# Patient Record
Sex: MALE | Race: WHITE
[De-identification: names, ages, dates, MRNs, and addresses within clinical notes are randomized per-mention and may not be internally consistent; named-entity substitution may affect disease eponyms.]

---

## 2018-01-08 ENCOUNTER — HOSPITAL ENCOUNTER (OUTPATIENT)
Dept: HOSPITAL 89 - LAB | Age: 73
End: 2018-01-08
Attending: INTERNAL MEDICINE
Payer: MEDICARE

## 2018-01-08 VITALS — BODY MASS INDEX: 24.78 KG/M2

## 2018-01-08 DIAGNOSIS — I82.409: Primary | ICD-10-CM

## 2018-01-08 LAB — INR PPP: 6.4

## 2018-01-08 PROCEDURE — 36415 COLL VENOUS BLD VENIPUNCTURE: CPT

## 2018-01-08 PROCEDURE — 85610 PROTHROMBIN TIME: CPT

## 2018-01-10 ENCOUNTER — HOSPITAL ENCOUNTER (OUTPATIENT)
Dept: HOSPITAL 89 - LAB | Age: 73
End: 2018-01-10
Attending: INTERNAL MEDICINE
Payer: MEDICARE

## 2018-01-10 VITALS — BODY MASS INDEX: 24.78 KG/M2

## 2018-01-10 DIAGNOSIS — I82.409: Primary | ICD-10-CM

## 2018-01-10 LAB — INR PPP: 3.84

## 2018-01-10 PROCEDURE — 85610 PROTHROMBIN TIME: CPT

## 2018-01-10 PROCEDURE — 36415 COLL VENOUS BLD VENIPUNCTURE: CPT

## 2018-01-16 ENCOUNTER — HOSPITAL ENCOUNTER (OUTPATIENT)
Dept: HOSPITAL 89 - LAB | Age: 73
End: 2018-01-16
Attending: INTERNAL MEDICINE
Payer: MEDICARE

## 2018-01-16 VITALS — BODY MASS INDEX: 24.78 KG/M2

## 2018-01-16 DIAGNOSIS — R79.89: ICD-10-CM

## 2018-01-16 DIAGNOSIS — E78.5: ICD-10-CM

## 2018-01-16 DIAGNOSIS — R94.31: ICD-10-CM

## 2018-01-16 DIAGNOSIS — I82.409: ICD-10-CM

## 2018-01-16 DIAGNOSIS — I10: ICD-10-CM

## 2018-01-16 DIAGNOSIS — R06.09: ICD-10-CM

## 2018-01-16 DIAGNOSIS — E03.9: ICD-10-CM

## 2018-01-16 DIAGNOSIS — R07.9: ICD-10-CM

## 2018-01-16 DIAGNOSIS — M10.9: ICD-10-CM

## 2018-01-16 DIAGNOSIS — I28.8: ICD-10-CM

## 2018-01-16 DIAGNOSIS — J98.6: ICD-10-CM

## 2018-01-16 DIAGNOSIS — Z96.611: Primary | ICD-10-CM

## 2018-01-16 LAB
INR PPP: 2.81
LDLC SERPL-MCNC: 132 MG/DL
PLATELET COUNT, AUTOMATED: 211 K/UL (ref 150–450)

## 2018-01-16 PROCEDURE — 84450 TRANSFERASE (AST) (SGOT): CPT

## 2018-01-16 PROCEDURE — 83718 ASSAY OF LIPOPROTEIN: CPT

## 2018-01-16 PROCEDURE — 84295 ASSAY OF SERUM SODIUM: CPT

## 2018-01-16 PROCEDURE — 84520 ASSAY OF UREA NITROGEN: CPT

## 2018-01-16 PROCEDURE — 82247 BILIRUBIN TOTAL: CPT

## 2018-01-16 PROCEDURE — 82374 ASSAY BLOOD CARBON DIOXIDE: CPT

## 2018-01-16 PROCEDURE — 82947 ASSAY GLUCOSE BLOOD QUANT: CPT

## 2018-01-16 PROCEDURE — 71046 X-RAY EXAM CHEST 2 VIEWS: CPT

## 2018-01-16 PROCEDURE — 82435 ASSAY OF BLOOD CHLORIDE: CPT

## 2018-01-16 PROCEDURE — 82310 ASSAY OF CALCIUM: CPT

## 2018-01-16 PROCEDURE — 71275 CT ANGIOGRAPHY CHEST: CPT

## 2018-01-16 PROCEDURE — 82565 ASSAY OF CREATININE: CPT

## 2018-01-16 PROCEDURE — 84478 ASSAY OF TRIGLYCERIDES: CPT

## 2018-01-16 PROCEDURE — 83880 ASSAY OF NATRIURETIC PEPTIDE: CPT

## 2018-01-16 PROCEDURE — 84075 ASSAY ALKALINE PHOSPHATASE: CPT

## 2018-01-16 PROCEDURE — 82465 ASSAY BLD/SERUM CHOLESTEROL: CPT

## 2018-01-16 PROCEDURE — 85610 PROTHROMBIN TIME: CPT

## 2018-01-16 PROCEDURE — 85379 FIBRIN DEGRADATION QUANT: CPT

## 2018-01-16 PROCEDURE — 84132 ASSAY OF SERUM POTASSIUM: CPT

## 2018-01-16 PROCEDURE — 84443 ASSAY THYROID STIM HORMONE: CPT

## 2018-01-16 PROCEDURE — 82040 ASSAY OF SERUM ALBUMIN: CPT

## 2018-01-16 PROCEDURE — 85025 COMPLETE CBC W/AUTO DIFF WBC: CPT

## 2018-01-16 PROCEDURE — 84460 ALANINE AMINO (ALT) (SGPT): CPT

## 2018-01-16 PROCEDURE — 36415 COLL VENOUS BLD VENIPUNCTURE: CPT

## 2018-01-16 PROCEDURE — 84155 ASSAY OF PROTEIN SERUM: CPT

## 2018-01-16 PROCEDURE — 84550 ASSAY OF BLOOD/URIC ACID: CPT

## 2018-01-16 NOTE — RADIOLOGY IMAGING REPORT
FACILITY: [facility] 

 

PATIENT NAME: Adán Ford

: 1945

MR: [id number]

V: [id number]

EXAM DATE: 

ORDERING PHYSICIAN: OMARI DREW

TECHNOLOGIST: 

 

Location: [facility]

Patient: Adán Ford

: 1945

MRN: [id number]

Visit/Account:[id number]

Date of Sevice:  2018

 

ACCESSION #: [id number]

 

Exam type: CHEST PA AND LAT

 

History: sob

 

Comparison: 2016.

 

Findings:

 

There is chronic elevation of the right hemidiaphragm.  No evidence of acute-appearing pulmonary cons
olidation pleural effusion or pulmonary edema.  The cardiac silhouette is normal in size.  There Is a
 left shoulder arthroplasty.

 

IMPRESSION:

 

1.  Chronic elevation of the right hemidiaphragm although no evidence of acute pulmonary consolidatio
n seen

 

Report Dictated By: Salina Pereira MD at 2018 2:07 PM

 

Report E-Signed By: Salina Pereira MD  at 2018 2:09 PM

 

WSN:[id number]

## 2018-01-16 NOTE — RADIOLOGY IMAGING REPORT
FACILITY: [facility] 

 

PATIENT NAME: Adán Ford

: 1945

MR: [id number]

V: [id number]

EXAM DATE: 

ORDERING PHYSICIAN: OMARI DREW

TECHNOLOGIST: 

 

Location: [facility]

Patient: Adán Ford

: 1945

MRN: [id number]

Visit/Account:[id number]

Date of Sevice:  2018

 

ACCESSION #: [id number]

 

CTA CHEST WW/O CNTR (PULM ANG)

 

HISTORY:  see dx

 

ADDITIONAL HISTORY:  Elevated d-dimer. Shortness of breath.

 

TECHNIQUE:  CTA chest with intravenous contrast.  Axial imaging acquired following administration of 
IV contrast timed for maximum opacification of the pulmonary arterial vasculature.  Slab 3-D MIP diana
nstructed images were also created for further evaluation and interpretation. Reconstruction of the [facility] data set includes multiplanar 2-D in the sagittal and coronal planes and 3-D reconstructed kami
nal slab MIP series.  3-D images were created by the technologist.  One of the following dose optimiz
ation techniques was utilized in the performance of this exam: Automated exposure control; adjustment
 of the mA and/or kV according to the patient's size; or use of an iterative  reconstruction techniqu
e.  Specific details can be referenced in the facility's radiology CT exam operational policy.

 

 

CONTRAST:  75  mL Isovue-370

 

COMPARISON:  Comparison abdomen pelvis CT 2017

 

FINDINGS:

 

Lungs/pleura:  There is persistent mild peribronchial thickening seen right lower lobe unchanged. The
re is associated mild right posterior basilar subpleural atelectasis or scar. There is a questionable
 1.2 x 0.8 cm nodule with in the right pleura which is seen axial image 143-150 series 4.

 

Heart/vessels:  There is filling defect in the right lower lobe pulmonary artery with nonenhancement 
of the right lower lobe pulmonary arterial tree. There is a thin thread of persistent central luminal
 patency with contrasted blood passing through this area. This is best appreciated coronal axial imag
es 135-142 series 4. Pulmonary arterial tree is otherwise normal in appearance.

 

No convincing evidence of right heart strain. Pulmonary trunk is prominent however measuring 2.7 cm d
iameter.

 

Prominent vascular plaque is seen in the LAD.

 

Mediastinum/lymph nodes:  Negative.

 

Visualized upper abdomen:  Negative.

 

Bones/soft tissues:  Negative.

 

Additional findings:  None

 

IMPRESSION:

 

Near-complete obstruction of the right lower lobe pulmonary artery likely related to an acute or suba
cute right lower lobe pulmonary embolus. There is associated mild chronic right lower lobe peribronch
ial thickening and minimal right basilar scarring with a questionable right lower lobe pleural nodule
. This raises the less likely possibility of malignancy but should warrant a follow-up study. Consequ
ently, I would recommend a short-term follow-up contrasted CTA chest in 4 weeks to ensure sure there 
is resolution of the right lower lobe pulmonary arterial pathology and to reevaluate the right lower 
lobe pleura.

 

Suspect pulmonary hypertension

 

Results were called to OMARI DREW  at  2018 4:30 PM. He informs me the patient does have a h
istory of previous pulmonary embolus but I do not have access to those prior exams. Acuity of the cur
rent pulmonary embolus is uncertain although with residual tiny central patent lumen suggests it is o
rganized and either subacute or chronic

 

Report Dictated By: Mat Villarreal MD at 2018 2:44 PM

 

Report E-Signed By: Mat Villarreal MD  at 2018 4:22 PM

 

WSN:[id number]

## 2018-01-16 NOTE — EKG
FACILITY: [facility] 

 

PATIENT NAME: ROSCOE HARLEY

: [id number]

MR: [id number]

V: [id number]

EXAM DATE: 

ORDERING PHYSICIAN: OMARI DREW

TECHNOLOGIST: MRAGON

 

Test Reason : SOB

Blood Pressure : ***/*** mmHG

Vent. Rate : 082 BPM     Atrial Rate : 082 BPM

   P-R Int : 218 ms          QRS Dur : 104 ms

    QT Int : 392 ms       P-R-T Axes : 063 -81 061 degrees

   QTc Int : 457 ms

 

Sinus rhythm with 1st degree AV block

Left axis deviation

Abnormal ECG

When compared with ECG of 28-DEC-2016 09:52,

premature ventricular complexes are no longer present

 

Referred By:             Confirmed By:

## 2018-01-17 ENCOUNTER — HOSPITAL ENCOUNTER (OUTPATIENT)
Dept: HOSPITAL 89 - LAB | Age: 73
End: 2018-01-17
Attending: INTERNAL MEDICINE
Payer: MEDICARE

## 2018-01-17 VITALS — BODY MASS INDEX: 24.78 KG/M2

## 2018-01-17 DIAGNOSIS — I82.409: Primary | ICD-10-CM

## 2018-01-17 LAB — INR PPP: 2.68

## 2018-01-17 PROCEDURE — 36415 COLL VENOUS BLD VENIPUNCTURE: CPT

## 2018-01-17 PROCEDURE — 85610 PROTHROMBIN TIME: CPT

## 2018-01-22 ENCOUNTER — HOSPITAL ENCOUNTER (OUTPATIENT)
Dept: HOSPITAL 89 - RESP | Age: 73
End: 2018-01-22
Attending: INTERNAL MEDICINE
Payer: MEDICARE

## 2018-01-22 VITALS — BODY MASS INDEX: 24.78 KG/M2

## 2018-01-22 DIAGNOSIS — I37.1: ICD-10-CM

## 2018-01-22 DIAGNOSIS — I51.7: Primary | ICD-10-CM

## 2018-01-22 DIAGNOSIS — I07.1: ICD-10-CM

## 2018-01-22 DIAGNOSIS — I34.0: ICD-10-CM

## 2018-01-22 DIAGNOSIS — I70.8: ICD-10-CM

## 2018-01-22 DIAGNOSIS — I27.20: ICD-10-CM

## 2018-01-22 DIAGNOSIS — I35.2: ICD-10-CM

## 2018-01-22 PROCEDURE — 93306 TTE W/DOPPLER COMPLETE: CPT

## 2018-01-22 PROCEDURE — 94726 PLETHYSMOGRAPHY LUNG VOLUMES: CPT

## 2018-01-22 PROCEDURE — 94729 DIFFUSING CAPACITY: CPT

## 2018-01-22 PROCEDURE — 94060 EVALUATION OF WHEEZING: CPT

## 2018-01-23 NOTE — RADIOLOGY IMAGING REPORT
FACILITY: [facility] 

 

PATIENT NAME: ROSCOE HARLEY

: [id number]

MR: [id number]

V: [id number]

EXAM DATE: 

ORDERING PHYSICIAN: OMARI DREW

TECHNOLOGIST: Shilpa Crouch

 

EXAMINATION:TWO-DIMENSIONAL ECHOCARDIOGRAPH

REASON:SHORTNESS OF BREATH.

 

2D Measurements (normal values in centimeters)

LV endLV endRV endVent.LV PostAorticLeftPercent

DiastolicSystolicDiastolicSeptumWallRootAtriumShortening

(3.5-5.7)(0.9-2.6)(0.6-1.1)(0.6-1.1)(2.0-3.7)(1.9-4.0)(25-35%)

 

[id number]%

 

STROKE VOLUME:  73 mL

ESTIMATED EJECTION FRACTION:75%

 

PARASTERNAL LONG AXIS:

Overall left ventricular systolic function does appear to be normal.  

No specific wall motion abnormalities are noted.  The right ventricle 

also appears to contract normally.  Aortic valve is sclerotic. Color 

examination of the valves revealed a trace of mitral and aortic 

insufficiency present.  TAPSE is measured at 1.8 which is within normal 

range for right ventricular function.  

 

PARASTERNAL SHORT AXIS: 

Somewhat technically difficult but again overall left ventricular 

function appears to be normal.  Aortic valve is not well seen but it 

does appear to be sclerotic possibly borderline stenotic.  Trace of 

aortic insufficiency is noted.  

 

APICAL FOUR AND TWO CHAMBER: 

Normal left ventricular and right ventricular systolic function.  

Aortic valve area was measured at 2.0 cm2 with mean pressure gradient 

across the valve of 14 mmHg and a dimensionless index of 0.4 indicating 

mild to moderate stenosis.  There is some aortic insufficiency present. 

 Mitral valve area was measured within normal range at 4.7 cm2.  Left 

atrial volume is moderately increased at 35 mL/m2.  Right atrial volume 

is measured within normal range at 13 mL/m2.  Mild amount of tricuspid 

insufficiency is noted. Tricuspid regurgitation V-max is measured at 

3.22 m/sec.  Estimated right atrial pressure is 3 mmHg.  There is mild 

mitral annular calcification but no stenosis of the valve.  No wall 

motion abnormalities are noted. 

 

SUBCOSTAL VIEW: 

Somewhat technically difficult but no pericardial effusion was noted.  

No atrial septal or ventricular septal defects were appreciated.  

Doppler examination of the mitral valve in diastole does reveal the A 

wave greater than the E wave.  IVC is normal in size.

 

OVERALL IMPRESSION: 

1. Normal left ventricular ejection fraction between 70 to 75%.  No 

wall motion abnormalities were noted.  

2. Grade 1/4 decrease in diastolic function.  

3. Mild right ventricular enlargement and moderate left atrial 

enlargement. The other chamber sizes are normal.  

4. The aortic valve which appears to be mild to borderline moderately 

stenotic.  The valve area was measured at 2.0 cm2 but the mean pressure 

gradient across the valve was 14 mmHg and the dimensionless index was 

0.4.  There is a trace amount of aortic insufficiency present.

5. There is mild mitral annular calcification.  No stenosis in the 

mitral valve was noted.  Trace to mild amount of mitral insufficiency 

was noted.

6. Trace amount of pulmonic insufficiency is noted.

7. A trace to mild amount of tricuspid insufficiency with estimated 

right ventricular systolic pressures of 44 mmHg which does include an 

estimated right atrial pressure of 3 mmHg indicating mild to borderline 

moderate pulmonary hypertension and increased right ventricular 

systolic pressures. 

8. In comparison to the examination done on 05/10/13, the only change 

has been that the aortic valve has now become stenotic.  The right 

ventricular pressures are approximately the same. 

 

 

 

Dictated by: LINSEY Coello M.D. on 2018 at 21:49   

Transcribed by: MIRNA on 2018 at 10:23    

Approved by: LINSEY Coello M.D. on 2018 at 19:53   

[facility]

## 2018-01-24 ENCOUNTER — HOSPITAL ENCOUNTER (OUTPATIENT)
Dept: HOSPITAL 89 - LAB | Age: 73
End: 2018-01-24
Attending: INTERNAL MEDICINE
Payer: MEDICARE

## 2018-01-24 VITALS — BODY MASS INDEX: 24.78 KG/M2

## 2018-01-24 DIAGNOSIS — I82.409: Primary | ICD-10-CM

## 2018-01-24 LAB — INR PPP: 2.91

## 2018-01-24 PROCEDURE — 36415 COLL VENOUS BLD VENIPUNCTURE: CPT

## 2018-01-24 PROCEDURE — 85610 PROTHROMBIN TIME: CPT

## 2018-02-02 ENCOUNTER — HOSPITAL ENCOUNTER (OUTPATIENT)
Dept: HOSPITAL 89 - LAB | Age: 73
End: 2018-02-02
Attending: INTERNAL MEDICINE
Payer: MEDICARE

## 2018-02-02 VITALS — BODY MASS INDEX: 24.78 KG/M2

## 2018-02-02 DIAGNOSIS — I82.409: Primary | ICD-10-CM

## 2018-02-02 LAB — INR PPP: 3.25

## 2018-02-02 PROCEDURE — 36415 COLL VENOUS BLD VENIPUNCTURE: CPT

## 2018-02-02 PROCEDURE — 85610 PROTHROMBIN TIME: CPT

## 2018-02-15 ENCOUNTER — HOSPITAL ENCOUNTER (OUTPATIENT)
Dept: HOSPITAL 89 - CT | Age: 73
End: 2018-02-15
Attending: INTERNAL MEDICINE
Payer: MEDICARE

## 2018-02-15 VITALS — BODY MASS INDEX: 24.78 KG/M2

## 2018-02-15 DIAGNOSIS — J98.11: Primary | ICD-10-CM

## 2018-02-15 LAB — INR PPP: 2.39

## 2018-02-15 PROCEDURE — 85610 PROTHROMBIN TIME: CPT

## 2018-02-15 PROCEDURE — 71275 CT ANGIOGRAPHY CHEST: CPT

## 2018-02-15 PROCEDURE — 36415 COLL VENOUS BLD VENIPUNCTURE: CPT

## 2018-02-15 NOTE — RADIOLOGY IMAGING REPORT
FACILITY: [facility] 

 

PATIENT NAME: Adán Ford

: 1945

MR: [id number]

V: [id number]

EXAM DATE: 

ORDERING PHYSICIAN: OMARI DREW

TECHNOLOGIST: 

 

Location: [facility]

Patient: Adán Ford

: 1945

MRN: [id number]

Visit/Account:[id number]

Date of Sevice:  2/15/2018

 

ACCESSION #: [id number]

 

CTA CHEST WW/O CNTR (PULM ANG)

 

HISTORY:  PE, CP

 

TECHNIQUE:  CTA chest with intravenous contrast attention to pulmonary arteries.  Sagittal, coronal a
nd slab 3D MIP coronal reconstructed images were also created for further evaluation and interpretati
on. One of the following dose optimization techniques was utilized in the performance of this exam: a
utomated exposure control; adjustment of the mA and/or kV according to the patient's size; or use of 
an iterative reconstruction technique.  Specific details can be referenced in the facility's radiolog
y CT exam operational policy.

 

CONTRAST:  75 mL Isovue-370 IV.

 

COMPARISON:  CT chest 2018.

 

FINDINGS:

 

Heart/vessels:  There is no filling defect in either the right or left pulmonary arterial vascular tr
ee.  Again seen is narrowing of the right lower lobe pulmonary artery, with adjacent soft tissue thic
kening.  This is unchanged dating back to 10/24/2016.

 

Lungs/pleura:  There is mild atelectasis right lower lobe.  There is mild pleural thickening right trinh
ng base, unchanged.  The lungs are otherwise clear.

 

Mediastinum:  Normal.

 

Lymph nodes:  There is no lymphadenopathy.

 

Visualized upper abdomen:  Visualized portions of the liver, spleen, adrenal glands, and pancreas are
 normal.

 

Bones/soft tissues:  There are postoperative changes from a left shoulder arthroplasty.

 

IMPRESSION:

1.  No evidence of acute pulmonary embolus.  There is poor filling of the right lower lobe pulmonary 
artery, with adjacent soft tissue thickening, with narrowing of the artery.  This is not significantl
y changed from comparison CT abdomen pelvis 10/24/2016.  Although CT abdomen pelvis was performed wit
h slightly different bolus technique.  This is unchanged from prior CT chest 2018.  There is par
tial atelectasis left lower lobe, likely secondary to postobstructive atelectasis.  Recommend follow-
up CT chest with IV contrast in 6 months to confirm stability.

2.  Postoperative changes from a left shoulder arthroplasty.

 

Report Dictated By: Siva Zimmerman at 2/15/2018 9:06 AM

 

Report E-Signed By: Siva Zimmerman  at 2/15/2018 9:23 AM

 

WSN:[id number]

## 2018-02-20 ENCOUNTER — HOSPITAL ENCOUNTER (OUTPATIENT)
Dept: HOSPITAL 89 - RESP | Age: 73
End: 2018-02-20
Attending: INTERNAL MEDICINE
Payer: MEDICARE

## 2018-02-20 VITALS — BODY MASS INDEX: 24.78 KG/M2

## 2018-02-20 DIAGNOSIS — R06.09: ICD-10-CM

## 2018-02-20 DIAGNOSIS — I26.99: ICD-10-CM

## 2018-02-20 DIAGNOSIS — R07.9: Primary | ICD-10-CM

## 2018-02-20 DIAGNOSIS — I82.409: ICD-10-CM

## 2018-02-20 DIAGNOSIS — I10: ICD-10-CM

## 2018-02-20 PROCEDURE — 78452 HT MUSCLE IMAGE SPECT MULT: CPT

## 2018-02-20 PROCEDURE — 93017 CV STRESS TEST TRACING ONLY: CPT

## 2018-02-20 NOTE — RADIOLOGY IMAGING REPORT
[place] [facility] 

 

PATIENT NAME: Adán Ford

: 1945

MR: [id number]

V: [id number]

EXAM DATE: 

ORDERING PHYSICIAN: OMARI DREW

TECHNOLOGIST: 

 

Location: [facility]

Patient: Adán Ford

: 1945

MRN: [id number]

Visit/Account:[id number]

Date of Sevice:  2018

 

ACCESSION #: [id number]

 

EXAMINATION:  Single isotope SPECT imaging with regadenoson infusion and gated SPECT imaging.

 

DATE OF EXAMINATION:  18.

 

DATE OF INTERPRETATION:  18.

 

REQUESTING PHYSICIAN:  OMARI DREW.

 

INDICATION:  The patient is a 72-year-old M evaluated for CP.

 

PROCEDURE:  After informed consent the patient received an intravenous injection of 12.2 mCi of Tc-99
m sestamibi followed at an appropriate time interval by rest imaging.  The patient then subsequently 
received an intravenous infusion of 0.4 mg of regadenoson per protocol without complication.  Resting
 heart rate was 82 bpm with a peak heart rate of 99 bpm.  Blood pressure at rest was 168 / 99 and fol
lowing infusion was 168 / 99.  Baseline EKG demonstrates sinus rhythm.  There were no EKG changes of 
ischemia following infusion.  Symptoms were nonspecific.  The patient then received an intravenous in
jection of 30.3 mCi of Tc-99m sestamibi followed by stress imaging.

 

RAW DATA:  Examination of the summed raw data revealed a good quality study.

 

MYOCARDIAL PERFUSION:  The tomographic images demonstrate normal myocardial perfusion with no evidenc
e of infarct or ischemia. There is no TID.

 

GATED IMAGES:  The gated images demonstrate hyperdynamic ejection fraction >70% with normal wall jean paul
on and thickening.

 

IMPRESSION:

 

1.  Nondiagnostic Lexiscan stress ECG

2.  Normal myocardial perfusion scan.

3.  Hyperdynamic LV systolic function; LVEF >70%.

4. Based on the results of this exam, the patient appears to be at low risk for future cardiovascular
 events.

 

Report Dictated By: Harsh Jasso at 2018 12:23 PM

 

Report E-Signed By: Harsh Jasso  at 2018 12:27 PM

 

WSN:[id number]

## 2018-03-01 ENCOUNTER — HOSPITAL ENCOUNTER (OUTPATIENT)
Dept: HOSPITAL 89 - LAB | Age: 73
End: 2018-03-01
Attending: INTERNAL MEDICINE
Payer: MEDICARE

## 2018-03-01 VITALS — BODY MASS INDEX: 24.78 KG/M2

## 2018-03-01 DIAGNOSIS — Z79.01: ICD-10-CM

## 2018-03-01 DIAGNOSIS — Z51.81: Primary | ICD-10-CM

## 2018-03-01 LAB — INR PPP: 1.99

## 2018-03-01 PROCEDURE — 85610 PROTHROMBIN TIME: CPT

## 2018-03-01 PROCEDURE — 36415 COLL VENOUS BLD VENIPUNCTURE: CPT

## 2018-03-15 ENCOUNTER — HOSPITAL ENCOUNTER (OUTPATIENT)
Dept: HOSPITAL 89 - LAB | Age: 73
End: 2018-03-15
Attending: INTERNAL MEDICINE
Payer: MEDICARE

## 2018-03-15 VITALS — BODY MASS INDEX: 24.78 KG/M2

## 2018-03-15 DIAGNOSIS — Z79.01: ICD-10-CM

## 2018-03-15 DIAGNOSIS — Z51.81: Primary | ICD-10-CM

## 2018-03-15 LAB — INR PPP: 1.49

## 2018-03-15 PROCEDURE — 36415 COLL VENOUS BLD VENIPUNCTURE: CPT

## 2018-03-15 PROCEDURE — 85610 PROTHROMBIN TIME: CPT

## 2018-03-23 ENCOUNTER — HOSPITAL ENCOUNTER (OUTPATIENT)
Dept: HOSPITAL 89 - LAB | Age: 73
End: 2018-03-23
Attending: INTERNAL MEDICINE
Payer: MEDICARE

## 2018-03-23 VITALS — BODY MASS INDEX: 24.78 KG/M2

## 2018-03-23 DIAGNOSIS — I26.99: Primary | ICD-10-CM

## 2018-03-23 LAB — INR PPP: 1.43

## 2018-03-23 PROCEDURE — 36415 COLL VENOUS BLD VENIPUNCTURE: CPT

## 2018-03-23 PROCEDURE — 85610 PROTHROMBIN TIME: CPT

## 2018-05-04 ENCOUNTER — HOSPITAL ENCOUNTER (OUTPATIENT)
Dept: HOSPITAL 89 - LAB | Age: 73
End: 2018-05-04
Attending: INTERNAL MEDICINE
Payer: MEDICARE

## 2018-05-04 VITALS — BODY MASS INDEX: 24.78 KG/M2

## 2018-05-04 DIAGNOSIS — Z79.01: ICD-10-CM

## 2018-05-04 DIAGNOSIS — Z51.81: Primary | ICD-10-CM

## 2018-05-04 LAB — INR PPP: 3.59

## 2018-05-04 PROCEDURE — 85610 PROTHROMBIN TIME: CPT

## 2018-05-04 PROCEDURE — 36415 COLL VENOUS BLD VENIPUNCTURE: CPT

## 2018-05-17 ENCOUNTER — HOSPITAL ENCOUNTER (OUTPATIENT)
Dept: HOSPITAL 89 - LAB | Age: 73
End: 2018-05-17
Attending: INTERNAL MEDICINE
Payer: MEDICARE

## 2018-05-17 VITALS — BODY MASS INDEX: 24.78 KG/M2

## 2018-05-17 DIAGNOSIS — I10: ICD-10-CM

## 2018-05-17 DIAGNOSIS — R06.09: ICD-10-CM

## 2018-05-17 DIAGNOSIS — E03.9: ICD-10-CM

## 2018-05-17 DIAGNOSIS — I26.99: ICD-10-CM

## 2018-05-17 DIAGNOSIS — M10.9: ICD-10-CM

## 2018-05-17 DIAGNOSIS — Z12.5: Primary | ICD-10-CM

## 2018-05-17 LAB — PLATELET COUNT, AUTOMATED: 232 K/UL (ref 150–450)

## 2018-05-17 PROCEDURE — 82565 ASSAY OF CREATININE: CPT

## 2018-05-17 PROCEDURE — 84153 ASSAY OF PSA TOTAL: CPT

## 2018-05-17 PROCEDURE — 82374 ASSAY BLOOD CARBON DIOXIDE: CPT

## 2018-05-17 PROCEDURE — 82435 ASSAY OF BLOOD CHLORIDE: CPT

## 2018-05-17 PROCEDURE — 84460 ALANINE AMINO (ALT) (SGPT): CPT

## 2018-05-17 PROCEDURE — 84075 ASSAY ALKALINE PHOSPHATASE: CPT

## 2018-05-17 PROCEDURE — 81001 URINALYSIS AUTO W/SCOPE: CPT

## 2018-05-17 PROCEDURE — 82947 ASSAY GLUCOSE BLOOD QUANT: CPT

## 2018-05-17 PROCEDURE — 85025 COMPLETE CBC W/AUTO DIFF WBC: CPT

## 2018-05-17 PROCEDURE — 84155 ASSAY OF PROTEIN SERUM: CPT

## 2018-05-17 PROCEDURE — 84132 ASSAY OF SERUM POTASSIUM: CPT

## 2018-05-17 PROCEDURE — 82310 ASSAY OF CALCIUM: CPT

## 2018-05-17 PROCEDURE — 84295 ASSAY OF SERUM SODIUM: CPT

## 2018-05-17 PROCEDURE — 84450 TRANSFERASE (AST) (SGOT): CPT

## 2018-05-17 PROCEDURE — 84520 ASSAY OF UREA NITROGEN: CPT

## 2018-05-17 PROCEDURE — 82040 ASSAY OF SERUM ALBUMIN: CPT

## 2018-05-17 PROCEDURE — 36415 COLL VENOUS BLD VENIPUNCTURE: CPT

## 2018-05-17 PROCEDURE — 84443 ASSAY THYROID STIM HORMONE: CPT

## 2018-05-17 PROCEDURE — 84550 ASSAY OF BLOOD/URIC ACID: CPT

## 2018-05-17 PROCEDURE — 83880 ASSAY OF NATRIURETIC PEPTIDE: CPT

## 2018-05-17 PROCEDURE — 82247 BILIRUBIN TOTAL: CPT

## 2018-05-31 ENCOUNTER — HOSPITAL ENCOUNTER (OUTPATIENT)
Dept: HOSPITAL 89 - LAB | Age: 73
End: 2018-05-31
Attending: INTERNAL MEDICINE
Payer: MEDICARE

## 2018-05-31 VITALS — BODY MASS INDEX: 24.78 KG/M2

## 2018-05-31 DIAGNOSIS — I82.409: ICD-10-CM

## 2018-05-31 DIAGNOSIS — I26.99: Primary | ICD-10-CM

## 2018-05-31 DIAGNOSIS — M10.9: ICD-10-CM

## 2018-05-31 LAB — INR PPP: 2.35

## 2018-05-31 PROCEDURE — 36415 COLL VENOUS BLD VENIPUNCTURE: CPT

## 2018-05-31 PROCEDURE — 85610 PROTHROMBIN TIME: CPT

## 2018-08-09 ENCOUNTER — HOSPITAL ENCOUNTER (OUTPATIENT)
Dept: HOSPITAL 89 - LAB | Age: 73
End: 2018-08-09
Attending: INTERNAL MEDICINE
Payer: MEDICARE

## 2018-08-09 VITALS — BODY MASS INDEX: 24.78 KG/M2

## 2018-08-09 DIAGNOSIS — M10.9: ICD-10-CM

## 2018-08-09 DIAGNOSIS — R10.13: ICD-10-CM

## 2018-08-09 DIAGNOSIS — I10: ICD-10-CM

## 2018-08-09 DIAGNOSIS — E03.9: ICD-10-CM

## 2018-08-09 DIAGNOSIS — I26.99: ICD-10-CM

## 2018-08-09 DIAGNOSIS — R53.83: ICD-10-CM

## 2018-08-09 DIAGNOSIS — R42: ICD-10-CM

## 2018-08-09 DIAGNOSIS — J98.11: Primary | ICD-10-CM

## 2018-08-09 LAB
INR PPP: 1.06
LDLC SERPL-MCNC: 88 MG/DL
PLATELET COUNT, AUTOMATED: 213 K/UL (ref 150–450)

## 2018-08-09 PROCEDURE — 83690 ASSAY OF LIPASE: CPT

## 2018-08-09 PROCEDURE — 36415 COLL VENOUS BLD VENIPUNCTURE: CPT

## 2018-08-09 PROCEDURE — 82247 BILIRUBIN TOTAL: CPT

## 2018-08-09 PROCEDURE — 82150 ASSAY OF AMYLASE: CPT

## 2018-08-09 PROCEDURE — 85610 PROTHROMBIN TIME: CPT

## 2018-08-09 PROCEDURE — 74022 RADEX COMPL AQT ABD SERIES: CPT

## 2018-08-09 PROCEDURE — 82310 ASSAY OF CALCIUM: CPT

## 2018-08-09 PROCEDURE — 84439 ASSAY OF FREE THYROXINE: CPT

## 2018-08-09 PROCEDURE — 81001 URINALYSIS AUTO W/SCOPE: CPT

## 2018-08-09 PROCEDURE — 83718 ASSAY OF LIPOPROTEIN: CPT

## 2018-08-09 PROCEDURE — 82374 ASSAY BLOOD CARBON DIOXIDE: CPT

## 2018-08-09 PROCEDURE — 84155 ASSAY OF PROTEIN SERUM: CPT

## 2018-08-09 PROCEDURE — 82947 ASSAY GLUCOSE BLOOD QUANT: CPT

## 2018-08-09 PROCEDURE — 85025 COMPLETE CBC W/AUTO DIFF WBC: CPT

## 2018-08-09 PROCEDURE — 82465 ASSAY BLD/SERUM CHOLESTEROL: CPT

## 2018-08-09 PROCEDURE — 82435 ASSAY OF BLOOD CHLORIDE: CPT

## 2018-08-09 PROCEDURE — 84460 ALANINE AMINO (ALT) (SGPT): CPT

## 2018-08-09 PROCEDURE — 84132 ASSAY OF SERUM POTASSIUM: CPT

## 2018-08-09 PROCEDURE — 84550 ASSAY OF BLOOD/URIC ACID: CPT

## 2018-08-09 PROCEDURE — 82565 ASSAY OF CREATININE: CPT

## 2018-08-09 PROCEDURE — 84520 ASSAY OF UREA NITROGEN: CPT

## 2018-08-09 PROCEDURE — 84443 ASSAY THYROID STIM HORMONE: CPT

## 2018-08-09 PROCEDURE — 84075 ASSAY ALKALINE PHOSPHATASE: CPT

## 2018-08-09 PROCEDURE — 84450 TRANSFERASE (AST) (SGOT): CPT

## 2018-08-09 PROCEDURE — 84295 ASSAY OF SERUM SODIUM: CPT

## 2018-08-09 PROCEDURE — 84478 ASSAY OF TRIGLYCERIDES: CPT

## 2018-08-09 PROCEDURE — 82040 ASSAY OF SERUM ALBUMIN: CPT

## 2018-08-09 NOTE — RADIOLOGY IMAGING REPORT
[place] [facility] 

 

PATIENT NAME: Adán Ford

: 1945

MR: [id number]

V: [id number]

EXAM DATE: 

ORDERING PHYSICIAN: OMARI DREW

TECHNOLOGIST: 

 

[place] [facility] [place]: Adán Ford

: 1945

MRN: [id number]

Visit/Account:[id number]

Date of Sevice:  2018

 

ACCESSION #: [id number]

 

Exam type: ACUTE ABDOMEN SERIES 3 VIEW

 

History: Abdomen pain, history of small bowel obstruction

 

Comparison: 2016.

 

Findings:

 

Gas is seen in a rounded loop of bowel in the midabdomen measuring approximately 7.9 cm in diameter. 
 This may represent a dilated loop of sigmoid colon although the appearance is nonspecific.  Remainde
r the bowel gas pattern is nonspecific.  There is no evidence of free air beneath hemidiaphragms.  In
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  There are moderate spondylotic changes 
of the thoracic spine.  PA view the chest reveals chronic elevation right hemidiaphragm mild amount o
f bibasilar scarring/atelectasis.  No evidence of focal infiltrates or pulmonary edema.  Cardiac silh
ouette is normal in size.  There is a left shoulder arthroplasty

 

IMPRESSION:

 

1.  L gas pattern is nonspecific other than gas seen in a rounded loop of bowel in the midabdomen shelby
suring approximately 7.9 cm in diameter.  This may represent a dilated loop of colon although the holli
earance is nonspecific.

 

Mild atelectasis/scarring in the lower lung fields

 

Report Dictated By: Salina Pereira MD at 2018 3:53 PM

 

Report E-Signed By: Salina Pereira MD  at 2018 3:56 PM

 

WSN:[id number]

## 2018-08-20 ENCOUNTER — HOSPITAL ENCOUNTER (OUTPATIENT)
Dept: HOSPITAL 89 - LAB | Age: 73
End: 2018-08-20
Attending: INTERNAL MEDICINE
Payer: MEDICARE

## 2018-08-20 VITALS — BODY MASS INDEX: 24.78 KG/M2

## 2018-08-20 DIAGNOSIS — I26.99: Primary | ICD-10-CM

## 2018-08-20 LAB — INR PPP: 1.13

## 2018-08-20 PROCEDURE — 36415 COLL VENOUS BLD VENIPUNCTURE: CPT

## 2018-08-20 PROCEDURE — 85610 PROTHROMBIN TIME: CPT

## 2018-08-21 ENCOUNTER — HOSPITAL ENCOUNTER (EMERGENCY)
Dept: HOSPITAL 89 - ER | Age: 73
Discharge: HOME | End: 2018-08-21
Payer: MEDICARE

## 2018-08-21 VITALS — SYSTOLIC BLOOD PRESSURE: 108 MMHG | DIASTOLIC BLOOD PRESSURE: 71 MMHG

## 2018-08-21 VITALS — BODY MASS INDEX: 24.78 KG/M2 | WEIGHT: 180 LBS

## 2018-08-21 DIAGNOSIS — F41.0: Primary | ICD-10-CM

## 2018-08-21 LAB
INR PPP: 1.29
PLATELET COUNT, AUTOMATED: 212 K/UL (ref 150–450)

## 2018-08-21 PROCEDURE — 85610 PROTHROMBIN TIME: CPT

## 2018-08-21 PROCEDURE — 84450 TRANSFERASE (AST) (SGOT): CPT

## 2018-08-21 PROCEDURE — 82040 ASSAY OF SERUM ALBUMIN: CPT

## 2018-08-21 PROCEDURE — 84460 ALANINE AMINO (ALT) (SGPT): CPT

## 2018-08-21 PROCEDURE — 85730 THROMBOPLASTIN TIME PARTIAL: CPT

## 2018-08-21 PROCEDURE — 83880 ASSAY OF NATRIURETIC PEPTIDE: CPT

## 2018-08-21 PROCEDURE — 71275 CT ANGIOGRAPHY CHEST: CPT

## 2018-08-21 PROCEDURE — 82565 ASSAY OF CREATININE: CPT

## 2018-08-21 PROCEDURE — 82310 ASSAY OF CALCIUM: CPT

## 2018-08-21 PROCEDURE — 84520 ASSAY OF UREA NITROGEN: CPT

## 2018-08-21 PROCEDURE — 82435 ASSAY OF BLOOD CHLORIDE: CPT

## 2018-08-21 PROCEDURE — 85025 COMPLETE CBC W/AUTO DIFF WBC: CPT

## 2018-08-21 PROCEDURE — 96375 TX/PRO/DX INJ NEW DRUG ADDON: CPT

## 2018-08-21 PROCEDURE — 82374 ASSAY BLOOD CARBON DIOXIDE: CPT

## 2018-08-21 PROCEDURE — 93005 ELECTROCARDIOGRAM TRACING: CPT

## 2018-08-21 PROCEDURE — 82247 BILIRUBIN TOTAL: CPT

## 2018-08-21 PROCEDURE — 84155 ASSAY OF PROTEIN SERUM: CPT

## 2018-08-21 PROCEDURE — 96374 THER/PROPH/DIAG INJ IV PUSH: CPT

## 2018-08-21 PROCEDURE — 82947 ASSAY GLUCOSE BLOOD QUANT: CPT

## 2018-08-21 PROCEDURE — 71045 X-RAY EXAM CHEST 1 VIEW: CPT

## 2018-08-21 PROCEDURE — 99284 EMERGENCY DEPT VISIT MOD MDM: CPT

## 2018-08-21 PROCEDURE — 84132 ASSAY OF SERUM POTASSIUM: CPT

## 2018-08-21 PROCEDURE — 84484 ASSAY OF TROPONIN QUANT: CPT

## 2018-08-21 PROCEDURE — 85379 FIBRIN DEGRADATION QUANT: CPT

## 2018-08-21 PROCEDURE — 84295 ASSAY OF SERUM SODIUM: CPT

## 2018-08-21 PROCEDURE — 84075 ASSAY ALKALINE PHOSPHATASE: CPT

## 2018-08-21 NOTE — RADIOLOGY IMAGING REPORT
FACILITY: [facility] 

 

PATIENT NAME: Adán Ford

: 1945

MR: [id number]

V: [id number]

EXAM DATE: 

ORDERING PHYSICIAN: ANGELA DICKINSON

TECHNOLOGIST: 

 

Location: [facility]

Patient: Adán Ford

: 1945

MRN: [id number]

Visit/Account:[id number]

Date of Sevice:  2018

 

ACCESSION #: [id number]

 

CTA CHEST WW/O CNTR (PULM ANG)

 

HISTORY:  Positive d-dimer, history of PE, chest pain

 

ADDITIONAL HISTORY:  None.

 

TECHNIQUE:  CTA chest with intravenous contrast.  Axial imaging acquired following administration of 
IV contrast timed for maximum opacification of the pulmonary arterial vasculature.  Slab 3-D MIP diana
nstructed images were also created for further evaluation and interpretation. Reconstruction of the [facility] data set includes multiplanar 2-D in the sagittal and coronal planes and 3-D reconstructed kami
nal slab MIP series.  3-D images were created by the technologist. Dose Lowering Technique

 

One of the following dose optimization techniques was utilized in the performance of this exam: Autom
ated exposure control; adjustment of the mA and/or kV according to the patient's size; or use of an i
terative  reconstruction technique.  Specific details can be referenced in the facility's radiology C
T exam operational policy.

 

 

 

CONTRAST:  75  mL Isovue-370

 

COMPARISON:  February 15, 2018 and 2018

 

FINDINGS:

 

Lungs/pleura:  Mild atelectasis and chronic peribronchial thickening in the lung bases appear similar
 to the prior study, right side more affected than left

 

Heart/vessels:  Again noted is almost complete opacification of the right lower lobe pulmonary artery
 with only a thin strand of contrast-enhancement seen within within the central lumen.  This appears 
relatively unchanged when compared to the prior CTA from 2018 and appears to be chronic. 
 The remainder of the pulmonary arterial tree is well opacified with contrast

 

Moderate to severe coronary artery calcifications are again noted

 

Mediastinum/lymph nodes:  Negative.

 

Visualized upper abdomen:  Negative.

 

Bones/soft tissues:  There are spondylotic changes of the thoracic spine

 

Additional findings:  None

 

IMPRESSION:

 

Again noted is almost complete opacification of the right lower lobe pulmonary artery with only a thi
n strand of contrast enhancement seen within the central lumen.  This finding is relatively unchanged
 when compared to 2018 and appears to be chronic.  The remainder the pulmonary arterial t
ree is well-opacified

 

Atelectasis and chronic peribronchial thickening in the lung bases appears some are to the prior stud
y, right side more affected than the left

 

Report Dictated By: Salina Pereira MD at 2018 5:08 PM

 

Report E-Signed By: Salina Pereira MD  at 2018 5:19 PM

 

WSN:[id number]

## 2018-08-21 NOTE — ER REPORT
History and Physical


Time Seen By MD:  14:25


Hx. of Stated Complaint:  


pt thinks he is having an anxiety attack, that is not getting better since this 


am.  Feels weak, uncoordinated, can't sit still, wants to crawlout of his skin


 (BJORN WILLETT MD)


HPI/ROS


CHIEF COMPLAINT: Panic attack





HISTORY OF PRESENT ILLNESS: Patient is a 73-year-old male with known anxiety. 

Presents with complaint of anxiety. Denies any injury denies fevers or chills 

denies chest pain shortness of breath denies abdominal pain nausea vomiting or 

diarrhea.





REVIEW OF SYSTEMS:


Respiratory: No cough, no dyspnea.


Cardiovascular: No chest pain, no palpitations.


Gastrointestinal: No vomiting, no abdominal pain.


Musculoskeletal: No back pain.


 (BJORN WILLETT MD)


Allergies:  


Coded Allergies:  


     morphine (Unverified  Adverse Reaction, Intermediate, VOMITING, 8/21/18)


Home Meds


Active Scripts


Promethazine Hcl (PROMETHAZINE HCL) 25 Mg Tablet, 25 MG PO Q8H Y for nausea, #

30 TAB


   Prov:OMARI DREW MD         8/9/18


Tramadol Hcl (TRAMADOL HCL) 50 Mg Tablet, 1 TAB PO QID for for chronic back pain

, #120 TAB 5 Refills


   Prov:OMARI RDEW MD         8/9/18


Amlodipine Besylate (AMLODIPINE BESYLATE) 5 Mg Tablet, 1 TAB PO QDAY, #90 TAB 3 

Refills


   Prov:OMARI DREW MD         8/9/18


Colchicine (COLCRYS) 0.6 Mg Tablet, 0.6 MG PO 1-2XD, #60 TAB


   Prov:OMARI DREW MD         5/31/18


Febuxostat (ULORIC) 40 Mg Tablet, 40 MG PO QDAY, #30 TAB 6 Refills


   Prov:OMARI DREW MD         5/31/18


Escitalopram Oxalate (ESCITALOPRAM OXALATE) 10 Mg Tablet, 10 MG PO QDAY, #30 

TAB 3 Refills


   Prov:OMARI DREW MD         5/31/18


Lisinopril (LISINOPRIL) 20 Mg Tablet, 20 MG PO BID, #180 TAB 3 Refills


   Prov:OMARI DREW MD         5/17/18


Pantoprazole Sodium (PANTOPRAZOLE SODIUM) 40 Mg Tablet.dr, 40 MG PO QDAY, #90 

TAB.SR 3 Refills


   Prov:OMARI DREW MD         3/21/18


Levothyroxine Sodium (LEVOTHYROXINE SODIUM) 75 Mcg Tablet, 75 MCG PO QDAY, #90 

TAB 3 Refills


   Prov:OMARI DREW MD         9/20/17


Fluticasone Prop 50 Mcg Ns (FLONASE 50 MCG NS) 16 Gm Spray.susp, 2 SPRAYS NS 

QDAY, #1 BOT 3 Refills


   Prov:OMARI DREW MD         6/20/17


Reported Medications


Warfarin Sodium (COUMADIN) 5 Mg Tablet, 5 MG PO BID


   8/21/18


Discontinued Scripts


Warfarin Sodium (COUMADIN) 5 Mg Tablet, 1-2 TAB PO QDAY, #40 TAB 9 Refills


   Prov:OMARI DREW MD         5/15/18


Past Medical/Surgical History


Past medical history for allergic rhinitis, hypertension, GERD, osteoarthritis, 

insomnia, hypothyroidism, tonsillectomy, left iliac aneurysm surgery 2014, 

appendectomy


 (BJORN WILLETT MD)


Hx Smoking:  No


Smoking Status:  Never Smoker


Exposure to Second Hand Smoke?:  No


Hx Substance Use Disorder:  No


Hx Alcohol Use:  No


 (BJORN WILLETT MD)


Constitutional





Vital Sign - Last 24 Hours








 8/21/18 8/21/18 8/21/18 8/21/18





 14:03 14:03 14:14 14:20


 


Temp  97.6  


 


Pulse  102  


 


Resp  22  


 


B/P (MAP) 133/87 (102) 133/87 128/104 (112) 


 


Pulse Ox  93  


 


O2 Delivery  Room Air  


 


O2 Flow Rate    2.0


 


    





 8/21/18 8/21/18 8/21/18 8/21/18





 14:29 14:59 15:03 15:29


 


Pulse 101 110  105


 


B/P (MAP)   102/97 (99) 


 


Pulse Ox 93 92  





 (LAURORA,ANGELA V DO)


Physical Exam


   General Appearance: The patient is alert, has no immediate need for airway 

protection and no signs of toxicity. [ ]


Eyes: Pupils equal and round no pallor or injection.


ENT, Mouth: Mucous membranes are moist.


Respiratory: There are no retractions, lungs are clear to auscultation.


Cardiovascular: Regular rate and rhythm. [ ]


Gastrointestinal:  Abdomen is soft and non tender, no masses, bowel sounds 

normal.


Neurological: Awake alert no acute distress


Skin: Warm and dry, no rashes.


Musculoskeletal:  Neck is supple non tender.


      Extremities are nontender, nonswollen and have full range of motion.


 (BJORN WILLETT MD)





Medical Decision Making


Data Points


Result Diagram:  


8/21/18 1503                                                                   

             8/21/18 1503





Laboratory





Hematology








Test


  8/21/18


15:00 8/21/18


15:03


 


D-Dimer Quantitative (PE/DVT)


  1.47 ug/ml


(0-0.50) 


 


 


Red Blood Count


  


  5.20 M/uL


(4.00-5.60)


 


Mean Corpuscular Volume


  


  86.2 fL


(80.0-96.0)


 


Mean Corpuscular Hemoglobin


  


  30.5 pg


(26.0-33.0)


 


Mean Corpuscular Hemoglobin


Concent 


  35.3 g/dL


(32.0-36.0)


 


Red Cell Distribution Width


  


  15.6 %


(11.5-14.5)


 


Mean Platelet Volume


  


  8.4 fL


(7.2-11.1)


 


Neutrophils (%) (Auto)


  


  51.1 %


(39.4-72.5)


 


Lymphocytes (%) (Auto)


  


  33.4 %


(17.6-49.6)


 


Monocytes (%) (Auto)


  


  9.2 %


(4.1-12.4)


 


Eosinophils (%) (Auto)


  


  5.4 %


(0.4-6.7)


 


Basophils (%) (Auto)


  


  0.9 %


(0.3-1.4)


 


Nucleated RBC Relative Count


(auto) 


  0.2 /100WBC 


 


 


Neutrophils # (Auto)


  


  3.0 K/uL


(2.0-7.4)


 


Lymphocytes # (Auto)


  


  1.9 K/uL


(1.3-3.6)


 


Monocytes # (Auto)


  


  0.5 K/uL


(0.3-1.0)


 


Eosinophils # (Auto)


  


  0.3 K/uL


(0.0-0.5)


 


Basophils # (Auto)


  


  0.1 K/uL


(0.0-0.1)


 


Nucleated RBC Absolute Count


(auto) 


  0.01 K/uL 


 


 


Prothrombin Time


  


  16.2 seconds


(12.0-14.4)


 


Prothromb Time International


Ratio 


  1.29 


 


 


Activated Partial


Thromboplast Time 


  27 seconds


(23-35)


 


Sodium Level


  


  140 mmol/L


(137-145)


 


Potassium Level


  


  3.7 mmol/L


(3.5-5.0)


 


Chloride Level


  


  105 mmol/L


()


 


Carbon Dioxide Level


  


  23 mmol/L


(22-30)


 


Blood Urea Nitrogen


  


  17 mg/dl


(9-21)


 


Creatinine


  


  0.90 mg/dl


(0.66-1.25)


 


Glomerular Filtration Rate


Calc 


  > 60.0 


 


 


Random Glucose


  


  113 mg/dl


()


 


Calcium Level


  


  9.3 mg/dl


(8.4-10.2)


 


Total Bilirubin


  


  0.5 mg/dl


(0.2-1.3)


 


Aspartate Amino Transf


(AST/SGOT) 


  38 U/L (0-35) 


 


 


Alanine Aminotransferase


(ALT/SGPT) 


  33 U/L (0-56) 


 


 


Alkaline Phosphatase


  


  151 U/L


(0-126)


 


Troponin I  0.014 ng/ml 


 


B-Type Natriuretic Peptide


  


  71 pg/ml


(0-100)


 


Total Protein


  


  8.3 g/dl


(6.3-8.2)


 


Albumin


  


  4.3 g/dl


(3.5-5.0)








Chemistry








Test


  8/21/18


15:00 8/21/18


15:03


 


D-Dimer Quantitative (PE/DVT)


  1.47 ug/ml


(0-0.50) 


 


 


White Blood Count


  


  5.8 k/uL


(4.5-11.0)


 


Red Blood Count


  


  5.20 M/uL


(4.00-5.60)


 


Hemoglobin


  


  15.8 g/dL


(14.0-18.0)


 


Hematocrit


  


  44.8 %


(42.0-52.0)


 


Mean Corpuscular Volume


  


  86.2 fL


(80.0-96.0)


 


Mean Corpuscular Hemoglobin


  


  30.5 pg


(26.0-33.0)


 


Mean Corpuscular Hemoglobin


Concent 


  35.3 g/dL


(32.0-36.0)


 


Red Cell Distribution Width


  


  15.6 %


(11.5-14.5)


 


Platelet Count


  


  212 K/uL


(150-450)


 


Mean Platelet Volume


  


  8.4 fL


(7.2-11.1)


 


Neutrophils (%) (Auto)


  


  51.1 %


(39.4-72.5)


 


Lymphocytes (%) (Auto)


  


  33.4 %


(17.6-49.6)


 


Monocytes (%) (Auto)


  


  9.2 %


(4.1-12.4)


 


Eosinophils (%) (Auto)


  


  5.4 %


(0.4-6.7)


 


Basophils (%) (Auto)


  


  0.9 %


(0.3-1.4)


 


Nucleated RBC Relative Count


(auto) 


  0.2 /100WBC 


 


 


Neutrophils # (Auto)


  


  3.0 K/uL


(2.0-7.4)


 


Lymphocytes # (Auto)


  


  1.9 K/uL


(1.3-3.6)


 


Monocytes # (Auto)


  


  0.5 K/uL


(0.3-1.0)


 


Eosinophils # (Auto)


  


  0.3 K/uL


(0.0-0.5)


 


Basophils # (Auto)


  


  0.1 K/uL


(0.0-0.1)


 


Nucleated RBC Absolute Count


(auto) 


  0.01 K/uL 


 


 


Prothrombin Time


  


  16.2 seconds


(12.0-14.4)


 


Prothromb Time International


Ratio 


  1.29 


 


 


Activated Partial


Thromboplast Time 


  27 seconds


(23-35)


 


Glomerular Filtration Rate


Calc 


  > 60.0 


 


 


Calcium Level


  


  9.3 mg/dl


(8.4-10.2)


 


Total Bilirubin


  


  0.5 mg/dl


(0.2-1.3)


 


Aspartate Amino Transf


(AST/SGOT) 


  38 U/L (0-35) 


 


 


Alanine Aminotransferase


(ALT/SGPT) 


  33 U/L (0-56) 


 


 


Alkaline Phosphatase


  


  151 U/L


(0-126)


 


Troponin I  0.014 ng/ml 


 


B-Type Natriuretic Peptide


  


  71 pg/ml


(0-100)


 


Total Protein


  


  8.3 g/dl


(6.3-8.2)


 


Albumin


  


  4.3 g/dl


(3.5-5.0)








Coagulation








Test


  8/21/18


15:00 8/21/18


15:03


 


D-Dimer Quantitative (PE/DVT) 1.47 ug/ml  


 


Prothrombin Time  16.2 seconds 


 


Prothromb Time International


Ratio 


  1.29 


 


 


Activated Partial


Thromboplast Time 


  27 seconds 


 





 (ANGELA DICKINSON DO)





EKG/Imaging


EKG Interpretation


EKG shows sinus tachycardia with fusion complexes incomplete right bundle 

branch block with left anterior fascicular block.


EKG shows sinus rhythm with occasional PVCs. Incomplete right bundle branch 

block with left anterior fascicular block this was compared to an EKG from 01/16 /2018 which showed sinus rhythm with first-degree AV block left axis deviation, 

no significant changes were noted.


Monitor Interpretation:  Sinus Tachycardia


 (BJORN WILLETT MD)





ED Course/Re-evaluation


ED Course


 08/21/2018 2:31:50 pm symptoms consistent with anxiety attack we will check EKG


 (BJORN WILLETT MD)


Clinical Indication for ER IV:  IV Access


ED Course


 08/21/2018 4:19:42 pm Pt signed out to me pending d-dimer and INR.  PTs d-

dimer is elevated and inr is not therapeutic. spoke with pt and he had his INR 

checked the other day and he is aware it is low and was told to start today 

doubling up on his coumadin.  Will image for PE since pt is not therapeutic. 





 08/21/2018 5:30:55 pm Pts CT shows chronic PE but nothing new. PT is feeling 

much  better. Family asked if we could write for an anxiety medication.  PT is 

also going to have his inr rechecked on Monday by pcp


Decision to Disposition Date:  Aug 21, 2018


Decision to Disposition Time:  17:31


 (ANGELA DICKINSON DO)





Depart


Departure


Latest Vital Signs





Vital Signs








  Date Time  Temp Pulse Resp B/P (MAP) Pulse Ox O2 Delivery O2 Flow Rate FiO2


 


8/21/18 15:29  105      


 


8/21/18 15:03    102/97 (99)    


 


8/21/18 14:59     92   


 


8/21/18 14:20       2.0 


 


8/21/18 14:03 97.6  22   Room Air  





 (ANGELA DICKINSON DO)


Impression:  


 Primary Impression:  


 Anxiety attack


Condition:  Improved


Disposition:  HOME OR SELF-CARE


Referrals:  


OMARI DREW MD (PCP)


5 Days


New Scripts


Lorazepam (ATIVAN) 1 Mg Tablet


1 MG PO Q8-12H Y for ANXIETY, #10 TAB


   Prov: ANGELA DICKINSON DO         8/21/18


Patient Instructions:  Anxiety (GEN)





Additional Instructions:  


Your blood work today showed your warfarin level to be not therapeutic.   

Continue double your dose as per your family doctor. It is important that you 

have your INR rechecked to determine your dosage of warfarin this Friday or as 

instructed by your family doctor. 





Ativan one every 8 hours as needed for anxiety. 


Return as needed.











BJORN WILLETT MD Aug 21, 2018 14:25


ANGELA DICKINSON DO Aug 21, 2018 16:21

## 2018-08-21 NOTE — RADIOLOGY IMAGING REPORT
FACILITY: [facility] 

 

PATIENT NAME: Adán Ford

: 1945

MR: [id number]

V: [id number]

EXAM DATE: 

ORDERING PHYSICIAN: BJORN WILLETT

TECHNOLOGIST: 

 

Location: [facility] [place]

Patient: Adán Ford

: 1945

MRN: [id number]

Visit/Account:[id number]

Date of Sevice:  2018

 

ACCESSION #: [id number]

 

Single view of the chest

 

Indication: Chest pain..

 

Comparison: Examination of the chest from 2018

 

Findings:

Heart size within normal limits.

Stable elevation right hemidiaphragm.  Lungs are without new infiltrate, consolidation, effusion or p
neumothorax.  No failure.  No acute bony finding

 

 

IMPRESSION:

1. No acute cardiopulmonary process.

 

Report Dictated By: Nick Shore MD at 2018 4:32 PM

 

Report E-Signed By: Nick Shore MD  at 2018 4:33 PM

 

[id number]

## 2018-08-21 NOTE — EKG
FACILITY: [facility] 

 

PATIENT NAME: ROSCOE HARLEY

: [id number]

MR: [id number]

V: [id number]

EXAM DATE: 

ORDERING PHYSICIAN: BJORN WILLETT

TECHNOLOGIST: JASMINA

 

Test Reason : ANXIETY

Blood Pressure : ***/*** mmHG

Vent. Rate : 096 BPM     Atrial Rate : 096 BPM

   P-R Int : 176 ms          QRS Dur : 102 ms

    QT Int : 362 ms       P-R-T Axes : 041 -77 051 degrees

   QTc Int : 457 ms

 

Sinus rhythm with occasional premature ventricular complexes

Nonspecific interventricular conduction delay

Abnormal ECG

 

Confirmed by LAURE HERNANDEZ ([id number]) on 2018 2:49:11 PM

 

Referred By:  BRENNON           Confirmed By:LAURE HERNANDEZ

## 2018-08-21 NOTE — EKG
FACILITY: [facility] 

 

PATIENT NAME: ROSCOE HARLEY

: [id number]

MR: [id number]

V: [id number]

EXAM DATE: 

ORDERING PHYSICIAN: BJORN WILLETT

TECHNOLOGIST: 

 

Test Reason : anxiety

Blood Pressure : ***/*** mmHG

Vent. Rate : 107 BPM     Atrial Rate : 107 BPM

   P-R Int : 174 ms          QRS Dur : 102 ms

    QT Int : 346 ms       P-R-T Axes : 045 -85 056 degrees

   QTc Int : 461 ms

 

Sinus tachycardia with possible aberrantly conducted premature supraventricular complex

Nonspecific interventricular conduction delay

Left axis

Abnormal ECG

Similar to previous

Confirmed by LAURE HERNANDEZ ([id number]) on 2018 6:18:06 AM

 

Referred By:             Confirmed By:LAURE HERNANDEZ

## 2018-08-29 ENCOUNTER — HOSPITAL ENCOUNTER (OUTPATIENT)
Dept: HOSPITAL 89 - LAB | Age: 73
End: 2018-08-29
Attending: INTERNAL MEDICINE
Payer: MEDICARE

## 2018-08-29 VITALS — BODY MASS INDEX: 24.78 KG/M2

## 2018-08-29 DIAGNOSIS — I45.10: ICD-10-CM

## 2018-08-29 DIAGNOSIS — R94.31: ICD-10-CM

## 2018-08-29 DIAGNOSIS — I10: ICD-10-CM

## 2018-08-29 DIAGNOSIS — E03.9: ICD-10-CM

## 2018-08-29 DIAGNOSIS — I27.20: ICD-10-CM

## 2018-08-29 DIAGNOSIS — Z01.818: Primary | ICD-10-CM

## 2018-08-29 DIAGNOSIS — I26.99: ICD-10-CM

## 2018-08-29 LAB — PLATELET COUNT, AUTOMATED: 215 K/UL (ref 150–450)

## 2018-08-29 PROCEDURE — 36415 COLL VENOUS BLD VENIPUNCTURE: CPT

## 2018-08-29 PROCEDURE — 82374 ASSAY BLOOD CARBON DIOXIDE: CPT

## 2018-08-29 PROCEDURE — 84443 ASSAY THYROID STIM HORMONE: CPT

## 2018-08-29 PROCEDURE — 84075 ASSAY ALKALINE PHOSPHATASE: CPT

## 2018-08-29 PROCEDURE — 84520 ASSAY OF UREA NITROGEN: CPT

## 2018-08-29 PROCEDURE — 82565 ASSAY OF CREATININE: CPT

## 2018-08-29 PROCEDURE — 82310 ASSAY OF CALCIUM: CPT

## 2018-08-29 PROCEDURE — 84295 ASSAY OF SERUM SODIUM: CPT

## 2018-08-29 PROCEDURE — 85025 COMPLETE CBC W/AUTO DIFF WBC: CPT

## 2018-08-29 PROCEDURE — 82247 BILIRUBIN TOTAL: CPT

## 2018-08-29 PROCEDURE — 84460 ALANINE AMINO (ALT) (SGPT): CPT

## 2018-08-29 PROCEDURE — 84450 TRANSFERASE (AST) (SGOT): CPT

## 2018-08-29 PROCEDURE — 82947 ASSAY GLUCOSE BLOOD QUANT: CPT

## 2018-08-29 PROCEDURE — 82040 ASSAY OF SERUM ALBUMIN: CPT

## 2018-08-29 PROCEDURE — 82435 ASSAY OF BLOOD CHLORIDE: CPT

## 2018-08-29 PROCEDURE — 84132 ASSAY OF SERUM POTASSIUM: CPT

## 2018-08-29 PROCEDURE — 84155 ASSAY OF PROTEIN SERUM: CPT

## 2018-08-29 PROCEDURE — 81001 URINALYSIS AUTO W/SCOPE: CPT

## 2018-08-29 NOTE — EKG
FACILITY: [facility] 

 

PATIENT NAME: ROSCOE HARLEY

: [id number]

MR: [id number]

V: [id number]

EXAM DATE: 

ORDERING PHYSICIAN: OMARI DREW

TECHNOLOGIST: SAMARIA

 

Test Reason : PRE OP

Blood Pressure : ***/*** mmHG

Vent. Rate : 091 BPM     Atrial Rate : 091 BPM

   P-R Int : 192 ms          QRS Dur : 108 ms

    QT Int : 378 ms       P-R-T Axes : 060 270 074 degrees

   QTc Int : 464 ms

 

Normal sinus rhythm

Left axis deviation

Right bundle branch block

Inferior infarct , age undetermined

Abnormal ECG

No previous ECGs available

Confirmed by OMARI DREW ([id number]) on 2018 4:24:10 PM

 

Referred By:             Confirmed By:OMARI DREW

## 2018-08-31 ENCOUNTER — HOSPITAL ENCOUNTER (OUTPATIENT)
Dept: HOSPITAL 89 - RAD | Age: 73
End: 2018-08-31
Attending: INTERNAL MEDICINE
Payer: MEDICARE

## 2018-08-31 VITALS — BODY MASS INDEX: 24.78 KG/M2

## 2018-08-31 DIAGNOSIS — J98.6: Primary | ICD-10-CM

## 2018-08-31 PROCEDURE — 76000 FLUOROSCOPY <1 HR PHYS/QHP: CPT

## 2018-08-31 NOTE — RADIOLOGY IMAGING REPORT
[place] [facility] [place] 

 

PATIENT NAME: Adán Ford

: 1945

MR: [id number]

V: [id number]

EXAM DATE: 

ORDERING PHYSICIAN: OMARI DREW

TECHNOLOGIST: 

 

Location: [facility]

Patient: Adán Ford

: 1945

MRN: [id number]

Visit/Account:[id number]

Date of Sevice:  2018

 

ACCESSION #: [id number]

 

Diaphragm sniff test

 

INDICATION: Right hemidiaphragm elevation, preknee surgery

 

COMPARISON: Radiographs September 10, 2013

 

FINDINGS:

 

Fluoroscopy was performed through the chest during inspiration and expiration.

 

The right hemidiaphragm is elevated as before.  Both diaphragms move appropriately inferiorly and sup
eriorly with inspiration and expiration.

 

The cardiac silhouette is normal in size.  Left shoulder arthroplasty noted.  No apparent pulmonary a
bnormality.

 

Fluoroscopy time 0.5 minutes.

 

Dose area product 91.10 microGy*m2.

 

IMPRESSION:

 

Normal bilateral diaphragmatic motion without diaphragmatic paralysis.

 

The right hemidiaphragm is elevated as before which represents the baseline appearance in this patien
t.

 

Report Dictated By: Rohan Sanon MD at 2018 1:20 PM

 

Report E-Signed By: Rohan Sanon MD  at 2018 1:24 PM

 

WSN:[id number]

## 2018-10-01 ENCOUNTER — HOSPITAL ENCOUNTER (EMERGENCY)
Dept: HOSPITAL 89 - ER | Age: 73
Discharge: HOME | End: 2018-10-01
Payer: MEDICARE

## 2018-10-01 ENCOUNTER — HOSPITAL ENCOUNTER (OUTPATIENT)
Dept: HOSPITAL 89 - AMB | Age: 73
End: 2018-10-01
Payer: MEDICARE

## 2018-10-01 VITALS — WEIGHT: 185.25 LBS | BODY MASS INDEX: 24.78 KG/M2

## 2018-10-01 VITALS — BODY MASS INDEX: 24.78 KG/M2

## 2018-10-01 VITALS — SYSTOLIC BLOOD PRESSURE: 124 MMHG | DIASTOLIC BLOOD PRESSURE: 76 MMHG

## 2018-10-01 DIAGNOSIS — N18.9: ICD-10-CM

## 2018-10-01 DIAGNOSIS — M10.9: ICD-10-CM

## 2018-10-01 DIAGNOSIS — M79.662: Primary | ICD-10-CM

## 2018-10-01 DIAGNOSIS — R09.02: ICD-10-CM

## 2018-10-01 DIAGNOSIS — I82.412: Primary | ICD-10-CM

## 2018-10-01 LAB
INR PPP: 1.84
PLATELET COUNT, AUTOMATED: 216 K/UL (ref 150–450)

## 2018-10-01 PROCEDURE — 82247 BILIRUBIN TOTAL: CPT

## 2018-10-01 PROCEDURE — 99284 EMERGENCY DEPT VISIT MOD MDM: CPT

## 2018-10-01 PROCEDURE — 84295 ASSAY OF SERUM SODIUM: CPT

## 2018-10-01 PROCEDURE — 84520 ASSAY OF UREA NITROGEN: CPT

## 2018-10-01 PROCEDURE — 82040 ASSAY OF SERUM ALBUMIN: CPT

## 2018-10-01 PROCEDURE — 36415 COLL VENOUS BLD VENIPUNCTURE: CPT

## 2018-10-01 PROCEDURE — 82310 ASSAY OF CALCIUM: CPT

## 2018-10-01 PROCEDURE — 84155 ASSAY OF PROTEIN SERUM: CPT

## 2018-10-01 PROCEDURE — 84450 TRANSFERASE (AST) (SGOT): CPT

## 2018-10-01 PROCEDURE — 84132 ASSAY OF SERUM POTASSIUM: CPT

## 2018-10-01 PROCEDURE — 85025 COMPLETE CBC W/AUTO DIFF WBC: CPT

## 2018-10-01 PROCEDURE — 84460 ALANINE AMINO (ALT) (SGPT): CPT

## 2018-10-01 PROCEDURE — 82565 ASSAY OF CREATININE: CPT

## 2018-10-01 PROCEDURE — 82947 ASSAY GLUCOSE BLOOD QUANT: CPT

## 2018-10-01 PROCEDURE — 93971 EXTREMITY STUDY: CPT

## 2018-10-01 PROCEDURE — 96374 THER/PROPH/DIAG INJ IV PUSH: CPT

## 2018-10-01 PROCEDURE — 85730 THROMBOPLASTIN TIME PARTIAL: CPT

## 2018-10-01 PROCEDURE — 84075 ASSAY ALKALINE PHOSPHATASE: CPT

## 2018-10-01 PROCEDURE — 82435 ASSAY OF BLOOD CHLORIDE: CPT

## 2018-10-01 PROCEDURE — 85610 PROTHROMBIN TIME: CPT

## 2018-10-01 PROCEDURE — 82374 ASSAY BLOOD CARBON DIOXIDE: CPT

## 2018-10-01 NOTE — RADIOLOGY IMAGING REPORT
FACILITY: [facility] 

 

PATIENT NAME: Adán Ford

: 1945

MR: [id number]

V: [id number]

EXAM DATE: 

ORDERING PHYSICIAN: BJORN VALDERRAMA

TECHNOLOGIST: 

 

Location: [facility]

Patient: Adán Ford

: 1945

MRN: [id number]

Visit/Account:[id number]

Date of Sevice: 10/01/2018

 

ACCESSION #: [id number]

 

Venous Doppler ultrasound left lower extremity

 

Indication: Left calf pain.

 

Comparison: None Available

 

Findings:  Duplex Doppler and color flow imaging was performed. The common femoral vein is patent. Th
ere is thrombus noted throughout the entirety of the femoral vein. Noncompressibility of this respect
era segment is noted. The left popliteal vein, posterior tibial vein and peroneal veins are patent. C
olor-flow over lies the respective patent segments.

 

IMPRESSION:

1. Acute to subacute deep venous thrombus located throughout the entirety of the left femoral vein.

 

Results were called to Dr. BJORN VALDERRAMA at 10/1/2018 9:46 AM.

 

Report Dictated By: Mason Amaarl DO at 10/1/2018 9:37 AM

 

Report E-Signed By: Mason Amaral DO  at 10/1/2018 9:46 AM

 

WSN:[id number]

## 2018-10-01 NOTE — ER REPORT
History and Physical


Time Seen By MD:  08:00


Hx. of Stated Complaint:  


left ankle/foot pain since yesterday.


HPI/ROS


CHIEF COMPLAINT: foot/ankle pain





HISTORY OF PRESENT ILLNESS: Patient has reported history of gout, with last gout


flare 6 months ago. He was recently taken off his allopurinol as well as other 


medications including Coumadin, for which he was on for remote DVT, due to 


anticipated left knee surgery this coming Friday. He reports that 2 days ago he 


began having left toe pain which progressed to left ankle, and has now radiated 


up to left calf. He states that this again similar to gout flare and has just 


progressed. Of note patient has been keeping left knee slightly flexed on 


Tylenol and feels that this pressure may have exacerbated his pain as well. 


Patient does not no known triggers for gout flares, however he does report 


eating meat and sweets frequently. Patient denies injury, fevers or chills, 


chest pain or shortness of breath.





REVIEW OF SYSTEMS:


Constitutional: No fever, no chills.


Eyes: No discharge.


ENT: No sore throat. 


Cardiovascular: No chest pain, no palpitations.


Respiratory: No cough, no shortness of breath.


Gastrointestinal: No abdominal pain, no vomiting.


Genitourinary: No hematuria.


Musculoskeletal: No back pain.


Skin: No rashes.


Neurological: No headache.


Remainder of the 14 system rev:  Yes


Allergies:  


Coded Allergies:  


     morphine (Unverified  Adverse Reaction, Intermediate, VOMITING, 9/21/18)


Home Meds


Active Scripts


Lorazepam (ATIVAN) 1 Mg Tablet, 1 MG PO Q8-12H PRN for ANXIETY, #30 TAB


   Prov:OMARI DREW MD         8/29/18


Tramadol Hcl (TRAMADOL HCL) 50 Mg Tablet, 1 TAB PO QID for for chronic back 


pain, #120 TAB 5 Refills


   Prov:OMARI DREW MD         8/9/18


Amlodipine Besylate (AMLODIPINE BESYLATE) 5 Mg Tablet, 1 TAB PO QDAY, #90 TAB 3 


Refills


   Prov:OMARI DREW MD         8/9/18


Lisinopril (LISINOPRIL) 20 Mg Tablet, 20 MG PO BID, #180 TAB 3 Refills


   Prov:OMARI DREW MD         5/17/18


Pantoprazole Sodium (PANTOPRAZOLE SODIUM) 40 Mg Tablet.dr, 40 MG PO QDAY, #90 


TAB.SR 3 Refills


   Prov:OMARI DREW MD         3/21/18


Levothyroxine Sodium (LEVOTHYROXINE SODIUM) 75 Mcg Tablet, 75 MCG PO QDAY, #90 


TAB 3 Refills


   Prov:OMARI DREW MD         9/20/17


Discontinued Reported Medications


Warfarin Sodium (COUMADIN) 5 Mg Tablet, 5 MG PO BID


   8/21/18


Discontinued Scripts


Febuxostat (ULORIC) 40 Mg Tablet, 40 MG PO QDAY, #30 TAB 11 Refills


   Prov:OMARI DREW MD         8/29/18


Fluticasone Prop 50 Mcg Ns (FLONASE 50 MCG NS) 16 Gm Spray.susp, 2 SPRAYS NS 


QDAY, #1 BOT 3 Refills


   Prov:OMARI DREW MD         8/29/18


Reviewed Nurses Notes:  Yes


Old Medical Records Reviewed:  Yes


Hx Smoking:  No


Smoking Status:  Never Smoker


Exposure to Second Hand Smoke?:  No


Hx Substance Use Disorder:  No


Hx Alcohol Use:  No


Constitutional





Vital Sign - Last 24 Hours








 10/1/18





 07:54


 


Temp 98.6


 


Pulse 111


 


Resp 18


 


B/P (MAP) 122/71


 


Pulse Ox 94


 


O2 Delivery Nasal Cannula








Physical Exam


   General Appearance: The patient is alert, has no immediate need for airway 


protection and no signs of toxicity. 


Eyes: Pupils equal and round no pallor or injection.


ENT, Mouth: Mucous membranes are moist.


Respiratory: There are no retractions, lungs are clear to auscultation.


Cardiovascular: Regular rate and rhythm.


Abdomen - nondistended 


Neurological: alert, oriented, moves all extremities


Skin: Warm and dry, no rashes.


Musculoskeletal:  


      Left lower extremity - no edema, ttp to mid calf without mass or erythema.


TTP with light touch to left ankle, left great toe MTP; there is no erythema, 


ecchymosis, petechiae, or other exanthem





DIFFERENTIAL DIAGNOSIS: After history and physical exam differential diagnosis 


was considered for gout, septic joint, dvt, fracture, or other emergent etiology





Medical Decision Making


Data Points


Result Diagram:  


10/1/18 0831                                                                    


           10/1/18 0831





Laboratory





Hematology








Test


 10/1/18


08:31


 


Red Blood Count


 4.76 M/uL


(4.00-5.60)


 


Mean Corpuscular Volume


 90.4 fL


(80.0-96.0)


 


Mean Corpuscular Hemoglobin


 30.5 pg


(26.0-33.0)


 


Mean Corpuscular Hemoglobin


Concent 33.8 g/dL


(32.0-36.0)


 


Red Cell Distribution Width


 15.5 %


(11.5-14.5)


 


Mean Platelet Volume


 8.0 fL


(7.2-11.1)


 


Neutrophils (%) (Auto)


 77.7 %


(39.4-72.5)


 


Lymphocytes (%) (Auto)


 10.5 %


(17.6-49.6)


 


Monocytes (%) (Auto)


 11.0 %


(4.1-12.4)


 


Eosinophils (%) (Auto)


 0.2 %


(0.4-6.7)


 


Basophils (%) (Auto)


 0.6 %


(0.3-1.4)


 


Nucleated RBC Relative Count


(auto) 0.0 /100WBC 





 


Neutrophils # (Auto)


 7.1 K/uL


(2.0-7.4)


 


Lymphocytes # (Auto)


 1.0 K/uL


(1.3-3.6)


 


Monocytes # (Auto)


 1.0 K/uL


(0.3-1.0)


 


Eosinophils # (Auto)


 0.0 K/uL


(0.0-0.5)


 


Basophils # (Auto)


 0.1 K/uL


(0.0-0.1)


 


Nucleated RBC Absolute Count


(auto) 0.00 K/uL 





 


Prothrombin Time


 21.5 seconds


(12.0-14.4)


 


Prothromb Time International


Ratio 1.84 





 


Activated Partial


Thromboplast Time 32 seconds


(23-35)


 


Sodium Level


 141 mmol/L


(137-145)


 


Potassium Level


 4.3 mmol/L


(3.5-5.0)


 


Chloride Level


 102 mmol/L


()


 


Carbon Dioxide Level


 27 mmol/L


(22-30)


 


Blood Urea Nitrogen


 32 mg/dl


(9-21)


 


Creatinine


 1.40 mg/dl


(0.66-1.25)


 


Glomerular Filtration Rate


Calc 49.5 





 


Random Glucose


 124 mg/dl


()


 


Calcium Level


 9.2 mg/dl


(8.4-10.2)


 


Total Bilirubin


 1.0 mg/dl


(0.2-1.3)


 


Aspartate Amino Transf


(AST/SGOT) 33 U/L (0-35) 





 


Alanine Aminotransferase


(ALT/SGPT) 28 U/L (0-56) 





 


Alkaline Phosphatase


 137 U/L


(0-126)


 


Total Protein


 8.3 g/dl


(6.3-8.2)


 


Albumin


 4.1 g/dl


(3.5-5.0)








Chemistry








Test


 10/1/18


08:31


 


White Blood Count


 9.2 k/uL


(4.5-11.0)


 


Red Blood Count


 4.76 M/uL


(4.00-5.60)


 


Hemoglobin


 14.5 g/dL


(14.0-18.0)


 


Hematocrit


 43.0 %


(42.0-52.0)


 


Mean Corpuscular Volume


 90.4 fL


(80.0-96.0)


 


Mean Corpuscular Hemoglobin


 30.5 pg


(26.0-33.0)


 


Mean Corpuscular Hemoglobin


Concent 33.8 g/dL


(32.0-36.0)


 


Red Cell Distribution Width


 15.5 %


(11.5-14.5)


 


Platelet Count


 216 K/uL


(150-450)


 


Mean Platelet Volume


 8.0 fL


(7.2-11.1)


 


Neutrophils (%) (Auto)


 77.7 %


(39.4-72.5)


 


Lymphocytes (%) (Auto)


 10.5 %


(17.6-49.6)


 


Monocytes (%) (Auto)


 11.0 %


(4.1-12.4)


 


Eosinophils (%) (Auto)


 0.2 %


(0.4-6.7)


 


Basophils (%) (Auto)


 0.6 %


(0.3-1.4)


 


Nucleated RBC Relative Count


(auto) 0.0 /100WBC 





 


Neutrophils # (Auto)


 7.1 K/uL


(2.0-7.4)


 


Lymphocytes # (Auto)


 1.0 K/uL


(1.3-3.6)


 


Monocytes # (Auto)


 1.0 K/uL


(0.3-1.0)


 


Eosinophils # (Auto)


 0.0 K/uL


(0.0-0.5)


 


Basophils # (Auto)


 0.1 K/uL


(0.0-0.1)


 


Nucleated RBC Absolute Count


(auto) 0.00 K/uL 





 


Prothrombin Time


 21.5 seconds


(12.0-14.4)


 


Prothromb Time International


Ratio 1.84 





 


Activated Partial


Thromboplast Time 32 seconds


(23-35)


 


Glomerular Filtration Rate


Calc 49.5 





 


Calcium Level


 9.2 mg/dl


(8.4-10.2)


 


Total Bilirubin


 1.0 mg/dl


(0.2-1.3)


 


Aspartate Amino Transf


(AST/SGOT) 33 U/L (0-35) 





 


Alanine Aminotransferase


(ALT/SGPT) 28 U/L (0-56) 





 


Alkaline Phosphatase


 137 U/L


(0-126)


 


Total Protein


 8.3 g/dl


(6.3-8.2)


 


Albumin


 4.1 g/dl


(3.5-5.0)








Coagulation








Test


 10/1/18


08:31


 


Prothrombin Time 21.5 seconds 


 


Prothromb Time International


Ratio 1.84 





 


Activated Partial


Thromboplast Time 32 seconds 














ED Course/Re-evaluation


ED Course


Patient is a 73-year-old male who is recently been taken off his allopurinol and


Coumadin well awaiting a left knee surgery. He presents with left ankle and left


leg pain today. While I considered septic joint, his findings are more 


consistent with a gout flare. However his leg pain is also concerning for DVT. 


The ultrasound as discussed with radiologist appears to show an acute DVT in the


left femoral vein. I discussed this with patient, primary doctor, as well as 


orthopedic surgeon. At this point I believe it is best to restart Coumadin and 


follow-up closely with primary doctor. His primary doctor agrees as called for 


patient for an appointment in 2 days for reevaluation. I will also restart his 


allopurinol as her pain medication for his gout. Given the patient's creatinine 


is 1.4, we'll withhold further colchicine or ibuprofen and recommend Norco.


Decision to Disposition Date:  Oct 1, 2018


Decision to Disposition Time:  10:30





Depart


Departure


Latest Vital Signs





Vital Signs








  Date Time  Temp Pulse Resp B/P (MAP) Pulse Ox O2 Delivery O2 Flow Rate FiO2


 


10/1/18 07:54 98.6 111 18 122/71 94 Nasal Cannula  








Impression:  


   Primary Impression:  


   DVT (deep venous thrombosis)


   Additional Impressions:  


   Gout


   Renal insufficiency


Condition:  Improved


Disposition:  HOME OR SELF-CARE


Referrals:  


OMARI DREW MD (PCP)


2 Days


New Scripts


Docusate Sodium (COLACE) 100 Mg Capsule


100 MG PO BID for 7 Days, #14 CAPSULE


   Prov: BJORN VALDERRAMA MD         10/1/18 


Hydrocodone Bit/Acetaminophen (NORCO 5-325 TABLET) 1 Each Tablet


1 EACH PO Q6H for PAIN, #20 TAB


   Prov: BJORN VALDERRAMA MD         10/1/18


Patient Instructions:  Gout (ED)





Additional Instructions:  


As we discussed, please restart her Coumadin because you appear to have a new 


blood clot. He may take an extra dose this evening, then continue 5 mg daily. I 


also recommend he restart your Allopurinol as well as take norco as I 


prescribed, as needed for pain. While you are taking Norco, please take colace 


twice daily to prevent constipation. Please return for uncontrolled pain, 


fevers, new redness or swelling in ankle, chest pain or difficulty breathing, or


any concerns.





Problem Qualifiers








   Primary Impression:  


   DVT (deep venous thrombosis)


   DVT location:  lower extremity  Affected thrombotic vein of extremity:  


   femoral  Chronicity:  acute  Laterality:  left  Qualified Codes:  I82.412 - 


   Acute embolism and thrombosis of left femoral vein


   Additional Impressions:  


   Gout


   Gout site:  ankle  Gout etiology:  unspecified cause  Chronicity:  acute  


   Laterality:  left  Qualified Codes:  M10.9 - Gout, unspecified








BJORN VALDERRAMA MD                 Oct 1, 2018 08:36

## 2018-10-04 ENCOUNTER — HOSPITAL ENCOUNTER (OUTPATIENT)
Dept: HOSPITAL 89 - LAB | Age: 73
End: 2018-10-04
Attending: INTERNAL MEDICINE
Payer: MEDICARE

## 2018-10-04 VITALS — BODY MASS INDEX: 24.78 KG/M2

## 2018-10-04 DIAGNOSIS — E03.9: Primary | ICD-10-CM

## 2018-10-04 DIAGNOSIS — I10: ICD-10-CM

## 2018-10-04 DIAGNOSIS — I82.409: ICD-10-CM

## 2018-10-04 DIAGNOSIS — M10.9: ICD-10-CM

## 2018-10-04 LAB — INR PPP: 1.73

## 2018-10-04 PROCEDURE — 84450 TRANSFERASE (AST) (SGOT): CPT

## 2018-10-04 PROCEDURE — 85610 PROTHROMBIN TIME: CPT

## 2018-10-04 PROCEDURE — 84520 ASSAY OF UREA NITROGEN: CPT

## 2018-10-04 PROCEDURE — 36415 COLL VENOUS BLD VENIPUNCTURE: CPT

## 2018-10-04 PROCEDURE — 82374 ASSAY BLOOD CARBON DIOXIDE: CPT

## 2018-10-04 PROCEDURE — 84460 ALANINE AMINO (ALT) (SGPT): CPT

## 2018-10-04 PROCEDURE — 82565 ASSAY OF CREATININE: CPT

## 2018-10-04 PROCEDURE — 84155 ASSAY OF PROTEIN SERUM: CPT

## 2018-10-04 PROCEDURE — 84295 ASSAY OF SERUM SODIUM: CPT

## 2018-10-04 PROCEDURE — 82310 ASSAY OF CALCIUM: CPT

## 2018-10-04 PROCEDURE — 84075 ASSAY ALKALINE PHOSPHATASE: CPT

## 2018-10-04 PROCEDURE — 82435 ASSAY OF BLOOD CHLORIDE: CPT

## 2018-10-04 PROCEDURE — 82247 BILIRUBIN TOTAL: CPT

## 2018-10-04 PROCEDURE — 82040 ASSAY OF SERUM ALBUMIN: CPT

## 2018-10-04 PROCEDURE — 82947 ASSAY GLUCOSE BLOOD QUANT: CPT

## 2018-10-04 PROCEDURE — 84132 ASSAY OF SERUM POTASSIUM: CPT

## 2018-10-08 ENCOUNTER — HOSPITAL ENCOUNTER (OUTPATIENT)
Dept: HOSPITAL 89 - US | Age: 73
End: 2018-10-08
Attending: INTERNAL MEDICINE
Payer: MEDICARE

## 2018-10-08 ENCOUNTER — HOSPITAL ENCOUNTER (OUTPATIENT)
Dept: HOSPITAL 89 - LAB | Age: 73
End: 2018-10-08
Attending: INTERNAL MEDICINE
Payer: MEDICARE

## 2018-10-08 VITALS — BODY MASS INDEX: 24.78 KG/M2 | BODY MASS INDEX: 24.78 KG/M2

## 2018-10-08 DIAGNOSIS — N18.9: Primary | ICD-10-CM

## 2018-10-08 DIAGNOSIS — I82.409: ICD-10-CM

## 2018-10-08 DIAGNOSIS — R79.89: ICD-10-CM

## 2018-10-08 LAB — INR PPP: 2.18

## 2018-10-08 PROCEDURE — 76705 ECHO EXAM OF ABDOMEN: CPT

## 2018-10-08 PROCEDURE — 36415 COLL VENOUS BLD VENIPUNCTURE: CPT

## 2018-10-08 PROCEDURE — 81001 URINALYSIS AUTO W/SCOPE: CPT

## 2018-10-08 PROCEDURE — 85610 PROTHROMBIN TIME: CPT

## 2018-10-08 NOTE — RADIOLOGY IMAGING REPORT
FACILITY: [facility] 

 

PATIENT NAME: Adán Ford

: 1945

MR: [id number]

V: [id number]

EXAM DATE: 

ORDERING PHYSICIAN: OMARI DREW

TECHNOLOGIST: 

 

Location: [facility]

Patient: Adán Ford

: 1945

MRN: [id number]

Visit/Account:[id number]

Date of Sevice: 10/08/2018

 

ACCESSION #: [id number]

 

KIDNEYS

 

HISTORY:  elevated Creatinine

 

 

 

COMPARISON: Abdominal ultrasound from 2015

 

FINDINGS:

Kidneys:

Right kidney- 10.1 x 5.4 x 5.1 cm.  No hydronephrosis.  No cortical mass lesions.  No cortical thinni
ng..  Left kidney-  11.2 x 7.4 x 4.9 cm.  No hydronephrosis.  No cortical mass lesions.

Uniform and symmetric blood flow in each kidney by Doppler ultrasound.

Hydronephrosis: none.

 

Bladder: 80 mL prevoid.  30 mL post void.  Bilateral ureteral jets identified.  No bladder mass lesio
n identified..

Abdominal aorta and IVC: patent by Doppler ultrasound.

 

IMPRESSION:

One.  Negative ultrasound for acute pathology.  Specifically no hydronephrosis noted within either ki
dney.  No appreciable change in overall ultrasonographic appearance of the kidneys when compared to a
 study from 2015.

 

Report Dictated By: Scot Gilliland MD at 10/8/2018 10:05 AM

 

Report E-Signed By: Scot Gilliland MD  at 10/8/2018 10:10 AM

 

WSN:[id number]

## 2018-10-10 ENCOUNTER — HOSPITAL ENCOUNTER (EMERGENCY)
Dept: HOSPITAL 89 - ER | Age: 73
Discharge: HOME | End: 2018-10-10
Payer: MEDICARE

## 2018-10-10 VITALS — BODY MASS INDEX: 24.78 KG/M2 | WEIGHT: 185 LBS

## 2018-10-10 VITALS — DIASTOLIC BLOOD PRESSURE: 80 MMHG | SYSTOLIC BLOOD PRESSURE: 125 MMHG

## 2018-10-10 DIAGNOSIS — K52.9: Primary | ICD-10-CM

## 2018-10-10 DIAGNOSIS — R10.11: ICD-10-CM

## 2018-10-10 LAB
INR PPP: 2.73
PLATELET COUNT, AUTOMATED: 213 K/UL (ref 150–450)

## 2018-10-10 PROCEDURE — 84460 ALANINE AMINO (ALT) (SGPT): CPT

## 2018-10-10 PROCEDURE — 82374 ASSAY BLOOD CARBON DIOXIDE: CPT

## 2018-10-10 PROCEDURE — 84295 ASSAY OF SERUM SODIUM: CPT

## 2018-10-10 PROCEDURE — 82565 ASSAY OF CREATININE: CPT

## 2018-10-10 PROCEDURE — 82310 ASSAY OF CALCIUM: CPT

## 2018-10-10 PROCEDURE — 96361 HYDRATE IV INFUSION ADD-ON: CPT

## 2018-10-10 PROCEDURE — 81001 URINALYSIS AUTO W/SCOPE: CPT

## 2018-10-10 PROCEDURE — 74177 CT ABD & PELVIS W/CONTRAST: CPT

## 2018-10-10 PROCEDURE — 82040 ASSAY OF SERUM ALBUMIN: CPT

## 2018-10-10 PROCEDURE — 84520 ASSAY OF UREA NITROGEN: CPT

## 2018-10-10 PROCEDURE — 84450 TRANSFERASE (AST) (SGOT): CPT

## 2018-10-10 PROCEDURE — 82150 ASSAY OF AMYLASE: CPT

## 2018-10-10 PROCEDURE — 85730 THROMBOPLASTIN TIME PARTIAL: CPT

## 2018-10-10 PROCEDURE — 85025 COMPLETE CBC W/AUTO DIFF WBC: CPT

## 2018-10-10 PROCEDURE — 82947 ASSAY GLUCOSE BLOOD QUANT: CPT

## 2018-10-10 PROCEDURE — 83690 ASSAY OF LIPASE: CPT

## 2018-10-10 PROCEDURE — 85610 PROTHROMBIN TIME: CPT

## 2018-10-10 PROCEDURE — 82247 BILIRUBIN TOTAL: CPT

## 2018-10-10 PROCEDURE — 84075 ASSAY ALKALINE PHOSPHATASE: CPT

## 2018-10-10 PROCEDURE — 96375 TX/PRO/DX INJ NEW DRUG ADDON: CPT

## 2018-10-10 PROCEDURE — 84155 ASSAY OF PROTEIN SERUM: CPT

## 2018-10-10 PROCEDURE — 84132 ASSAY OF SERUM POTASSIUM: CPT

## 2018-10-10 PROCEDURE — 82435 ASSAY OF BLOOD CHLORIDE: CPT

## 2018-10-10 PROCEDURE — 99284 EMERGENCY DEPT VISIT MOD MDM: CPT

## 2018-10-10 PROCEDURE — 96374 THER/PROPH/DIAG INJ IV PUSH: CPT

## 2018-10-10 NOTE — RADIOLOGY IMAGING REPORT
FACILITY: [facility] 

 

PATIENT NAME: Adán Ford

: 1945

MR: [id number]

V: [id number]

EXAM DATE: 

ORDERING PHYSICIAN: SORAIDA MEDEIROS

TECHNOLOGIST: 

 

Location: [facility]

Patient: Adán Ford

: 1945

MRN: [id number]

Visit/Account:[id number]

Date of Sevice: 10/10/2018

 

ACCESSION #: [id number]

 

CT abdomen and pelvis with IV contrast

 

Indication: Abdominal pain.

 

Comparison:   2017..

 

Technique:   Axial CT images were obtained through the abdomen and pelvis during injection of nonioni
c iodinated intravenous contrast. Reformatted coronal and sagittal images were also obtained.

One of the following dose optimization techniques was utilized in the performance of this exam: Autom
ated exposure control; adjustment of the mA and/or kV according to the patient's size; or use of an i
terative  reconstruction technique.  Specific details can be referenced in the facility's radiology C
T exam operational policy.

Contrast:   75 ml of Isovue-370  IV contrast.

 

Findings:

Lower lung fields: Right basilar atelectasis.  Otherwise clear.

 

Liver: No focal parenchymal abnormality of the liver.

Biliary: Gallbladder appears unremarkable as well as the intra and extra hepatic biliary system.

Pancreas: Normal appearance.

Spleen: Normal appearance.

Adrenal glands: Unremarkable.

Kidneys / retroperitoneum: No evidence of nephrolithiasis or hydronephrosis.  No focal normality.

 

 

Bowel / peritoneum / mesenteries: Diverticula seen along the sigmoid colon without discrete indicatio
n of surrounding inflammation.  The colon shows no other focal abnormality.  The appendix is not defi
nitely visualized may been surgically removed.  The central low small bowel does show mild wall enhan
cement surrounding inflammatory changes with some mild prominent loops however no discrete indication
 of obstruction.  This is similar to the previous examination although the enhancement and inflammati
on is mildly worse.  The remaining small bowel shows no focal abnormality or obstruction.  The stomac
h shows no focal abnormality.

No free air, free fluid, fluid collections or areas of inflammation otherwise seen.

 

Lymph node assessment: No pathologic adenopathy identified.

 

Pelvic  structures: Appear unremarkable.

 

 

Vessels: Abdominal aorta does show tortuosity with atherosclerotic calcific changes.  No aneurysm or 
dissection.  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
m which is not significantly changed.  There is peripheral thrombus and calcifications however there 
is a patent lumen.

 

Musculoskeletal / Body wall: No acute or aggressive osseous abnormality.  Degenerative changes of spi
ne.

 

Small varices again seen along the low anterior and left subcutaneous fat.

 

IMPRESSION:

1.  The central low small bowel does show abnormal enhancement and surrounding inflammatory changes p
resent mild prominence of the small bowel in this region however no indication of obstruction at this
 time.  These findings would be consistent with acute enteritis, nonspecific.

2.  Sigmoid diverticulosis without radiographic indication diverticulitis.

3.  Mild right lower lobe atelectasis.

4.  Other stable chronic findings as above.

 

 

Report Dictated By: Garrett Patel at 10/10/2018 8:23 PM

 

Report E-Signed By: Garrett Patel  at 10/10/2018 8:31 PM

 

WSN:[id number]
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
## 2018-10-12 ENCOUNTER — HOSPITAL ENCOUNTER (OUTPATIENT)
Dept: HOSPITAL 89 - LAB | Age: 73
End: 2018-10-12
Attending: INTERNAL MEDICINE
Payer: MEDICARE

## 2018-10-12 VITALS — BODY MASS INDEX: 24.78 KG/M2

## 2018-10-12 DIAGNOSIS — I82.409: Primary | ICD-10-CM

## 2018-10-12 DIAGNOSIS — N18.9: ICD-10-CM

## 2018-10-12 LAB — INR PPP: 2.52

## 2018-10-12 PROCEDURE — 82435 ASSAY OF BLOOD CHLORIDE: CPT

## 2018-10-12 PROCEDURE — 84520 ASSAY OF UREA NITROGEN: CPT

## 2018-10-12 PROCEDURE — 85610 PROTHROMBIN TIME: CPT

## 2018-10-12 PROCEDURE — 84132 ASSAY OF SERUM POTASSIUM: CPT

## 2018-10-12 PROCEDURE — 84295 ASSAY OF SERUM SODIUM: CPT

## 2018-10-12 PROCEDURE — 82565 ASSAY OF CREATININE: CPT

## 2018-10-12 PROCEDURE — 82947 ASSAY GLUCOSE BLOOD QUANT: CPT

## 2018-10-12 PROCEDURE — 82310 ASSAY OF CALCIUM: CPT

## 2018-10-12 PROCEDURE — 82374 ASSAY BLOOD CARBON DIOXIDE: CPT

## 2018-10-18 ENCOUNTER — HOSPITAL ENCOUNTER (OUTPATIENT)
Dept: HOSPITAL 89 - LAB | Age: 73
End: 2018-10-18
Attending: INTERNAL MEDICINE
Payer: MEDICARE

## 2018-10-18 VITALS — BODY MASS INDEX: 24.78 KG/M2

## 2018-10-18 DIAGNOSIS — I82.409: Primary | ICD-10-CM

## 2018-10-18 DIAGNOSIS — I26.99: ICD-10-CM

## 2018-10-18 LAB — INR PPP: 4.23

## 2018-10-18 PROCEDURE — 85610 PROTHROMBIN TIME: CPT

## 2018-10-18 PROCEDURE — 36415 COLL VENOUS BLD VENIPUNCTURE: CPT

## 2018-11-03 ENCOUNTER — HOSPITAL ENCOUNTER (OUTPATIENT)
Dept: HOSPITAL 89 - ER | Age: 73
Setting detail: OBSERVATION
LOS: 2 days | Discharge: HOME | End: 2018-11-05
Attending: SURGERY | Admitting: SURGERY
Payer: MEDICARE

## 2018-11-03 VITALS
HEIGHT: 68 IN | WEIGHT: 183.75 LBS | BODY MASS INDEX: 27.85 KG/M2 | WEIGHT: 183.75 LBS | BODY MASS INDEX: 27.85 KG/M2 | HEIGHT: 68 IN

## 2018-11-03 DIAGNOSIS — Z88.5: ICD-10-CM

## 2018-11-03 DIAGNOSIS — N18.9: ICD-10-CM

## 2018-11-03 DIAGNOSIS — M1A.9XX0: ICD-10-CM

## 2018-11-03 DIAGNOSIS — E78.5: ICD-10-CM

## 2018-11-03 DIAGNOSIS — E03.9: ICD-10-CM

## 2018-11-03 DIAGNOSIS — I12.9: ICD-10-CM

## 2018-11-03 DIAGNOSIS — K56.50: Primary | ICD-10-CM

## 2018-11-03 DIAGNOSIS — F41.8: ICD-10-CM

## 2018-11-03 DIAGNOSIS — K21.9: ICD-10-CM

## 2018-11-03 DIAGNOSIS — I27.20: ICD-10-CM

## 2018-11-03 LAB — PLATELET COUNT, AUTOMATED: 267 K/UL (ref 150–450)

## 2018-11-03 PROCEDURE — 99284 EMERGENCY DEPT VISIT MOD MDM: CPT

## 2018-11-03 PROCEDURE — 96376 TX/PRO/DX INJ SAME DRUG ADON: CPT

## 2018-11-03 PROCEDURE — 85025 COMPLETE CBC W/AUTO DIFF WBC: CPT

## 2018-11-03 PROCEDURE — 83690 ASSAY OF LIPASE: CPT

## 2018-11-03 PROCEDURE — 82565 ASSAY OF CREATININE: CPT

## 2018-11-03 PROCEDURE — 84450 TRANSFERASE (AST) (SGOT): CPT

## 2018-11-03 PROCEDURE — 82435 ASSAY OF BLOOD CHLORIDE: CPT

## 2018-11-03 PROCEDURE — 96367 TX/PROPH/DG ADDL SEQ IV INF: CPT

## 2018-11-03 PROCEDURE — 96365 THER/PROPH/DIAG IV INF INIT: CPT

## 2018-11-03 PROCEDURE — 84460 ALANINE AMINO (ALT) (SGPT): CPT

## 2018-11-03 PROCEDURE — 82374 ASSAY BLOOD CARBON DIOXIDE: CPT

## 2018-11-03 PROCEDURE — 82310 ASSAY OF CALCIUM: CPT

## 2018-11-03 PROCEDURE — 81001 URINALYSIS AUTO W/SCOPE: CPT

## 2018-11-03 PROCEDURE — 71045 X-RAY EXAM CHEST 1 VIEW: CPT

## 2018-11-03 PROCEDURE — 84155 ASSAY OF PROTEIN SERUM: CPT

## 2018-11-03 PROCEDURE — 36415 COLL VENOUS BLD VENIPUNCTURE: CPT

## 2018-11-03 PROCEDURE — 82947 ASSAY GLUCOSE BLOOD QUANT: CPT

## 2018-11-03 PROCEDURE — 85610 PROTHROMBIN TIME: CPT

## 2018-11-03 PROCEDURE — 84132 ASSAY OF SERUM POTASSIUM: CPT

## 2018-11-03 PROCEDURE — 74250 X-RAY XM SM INT 1CNTRST STD: CPT

## 2018-11-03 PROCEDURE — 82150 ASSAY OF AMYLASE: CPT

## 2018-11-03 PROCEDURE — 84075 ASSAY ALKALINE PHOSPHATASE: CPT

## 2018-11-03 PROCEDURE — 82247 BILIRUBIN TOTAL: CPT

## 2018-11-03 PROCEDURE — 84295 ASSAY OF SERUM SODIUM: CPT

## 2018-11-03 PROCEDURE — 74177 CT ABD & PELVIS W/CONTRAST: CPT

## 2018-11-03 PROCEDURE — 96375 TX/PRO/DX INJ NEW DRUG ADDON: CPT

## 2018-11-03 PROCEDURE — 84520 ASSAY OF UREA NITROGEN: CPT

## 2018-11-03 PROCEDURE — 82040 ASSAY OF SERUM ALBUMIN: CPT

## 2018-11-03 NOTE — RADIOLOGY IMAGING REPORT
FACILITY: [facility] [place] 

 

PATIENT NAME: Adán Ford

: 1945

MR: [id number]

V: [id number]

EXAM DATE: 

ORDERING PHYSICIAN: SORAIDA MEDEIROS

TECHNOLOGIST: 

 

Location: [facility] - [place], [place]

Patient: Adán Ford

: 1945

MRN: [id number]

Visit/Account:[id number]

Date of Sevice: 2018

 

ACCESSION #: [id number]

 

EXAMINATION: CT abdomen and pelvis with IV contrast

 

HISTORY:  Vomiting. Abdominal pain. History of SBO.

 

TECHNIQUE:   Axial CT images of the abdomen and pelvis were obtained with IV contrast, with coronal a
nd sagittal 2D reconstructed images.

One of the following dose optimization techniques was utilized in the performance of this exam: Autom
ated exposure control; adjustment of the mA and/or kV according to the patient's size; or use of an i
terative  reconstruction technique.  Specific details can be referenced in the facility's radiology C
T exam operational policy.

 

Contrast:  75 mL of IV Isovue-370.

 

COMPARISON:  10/10/2018.

 

FINDINGS:

Liver:  Fatty infiltration of the liver. No focal liver lesion. The hepatic veins and portal veins ar
e patent.

Gallbladder and bile ducts:  Negative.

Spleen:  Negative.

Pancreas:  Negative.

Adrenal glands:  Negative.

Kidneys:  Negative. No hydronephrosis or urinary calculi.

 

Bowel and peritoneum:  There is dilatation of small bowel segments in the mid abdomen, measuring up t
o 4 cm in diameter, with fluid levels and some fecalized small bowel content indicative of stasis. Th
ere is abrupt tapered narrowing of a dilated small bowel segment in the mid abdomen, suspicious for s
mall bowel obstruction. Appearance is somewhat similar to the prior examination but with a slightly g
reater degree of small bowel dilatation in this region on the current exam. There is some adjacent me
senteric edema and soft tissue stranding. Distal loops of small bowel are relatively decompressed, wi
th a small amount of fluid and air. There has been prior bowel surgery with a patent small bowel anas
tomosis along the distal ileum. The colon is relatively decompressed, with minimal colonic stool. The
re are a few scattered colonic diverticula, without evidence of diverticulitis. No free fluid or free
 intraperitoneal air.

 

Pelvic  structures:  Negative.

Lymph node assessment:  Negative.

Vessels:  Moderate vascular calcifications. Normal caliber abdominal aorta. There is stable aneurysma
l dilatation of the left common iliac artery, measuring up to 2.5 cm. 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
he left external iliac vein, with superficial venous collaterals along the lower abdominal wall.

 

Musculoskeletal:   Chronic multilevel degenerative changes throughout the spine. No acute osseous fin
dings.

Body wall:  Negative.

Lung bases:  Negative.

 

IMPRESSION:

1. There is dilatation of small bowel segments in the mid abdomen, measuring up to 4 cm, with abrupt 
tapered narrowing along the central mesentery. Appearance is suspicious for small bowel obstruction. 
Relatively similar appearance to the prior examination. There is some adjacent nonspecific mesenteric
 edema and soft tissue stranding.

2. No other acute intra-abdominal findings.

3. Prior bowel surgery with a patent distal ileal anastomosis. The colon is relatively decompressed.

4. Colonic diverticulosis.

5. Hepatic steatosis.

 

 

Report Dictated By: Giovanni Archibald MD at 11/3/2018 11:34 PM

 

Report E-Signed By: Giovanni Archibald MD  at 11/3/2018 11:46 PM

 

WSN:[id number]
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
## 2018-11-04 VITALS — SYSTOLIC BLOOD PRESSURE: 120 MMHG | DIASTOLIC BLOOD PRESSURE: 73 MMHG

## 2018-11-04 VITALS — DIASTOLIC BLOOD PRESSURE: 92 MMHG | SYSTOLIC BLOOD PRESSURE: 156 MMHG

## 2018-11-04 VITALS — DIASTOLIC BLOOD PRESSURE: 69 MMHG | SYSTOLIC BLOOD PRESSURE: 113 MMHG

## 2018-11-04 VITALS — SYSTOLIC BLOOD PRESSURE: 115 MMHG | DIASTOLIC BLOOD PRESSURE: 75 MMHG

## 2018-11-04 VITALS — DIASTOLIC BLOOD PRESSURE: 70 MMHG | SYSTOLIC BLOOD PRESSURE: 115 MMHG

## 2018-11-04 VITALS — SYSTOLIC BLOOD PRESSURE: 120 MMHG | DIASTOLIC BLOOD PRESSURE: 70 MMHG

## 2018-11-04 VITALS — SYSTOLIC BLOOD PRESSURE: 124 MMHG | DIASTOLIC BLOOD PRESSURE: 75 MMHG

## 2018-11-04 LAB
INR PPP: 2.23
PLATELET COUNT, AUTOMATED: 200 K/UL (ref 150–450)

## 2018-11-04 RX ADMIN — ONDANSETRON HYDROCHLORIDE PRN MG: 2 INJECTION, SOLUTION INTRAMUSCULAR; INTRAVENOUS at 14:21

## 2018-11-04 RX ADMIN — LISINOPRIL SCH MG: 20 TABLET ORAL at 10:10

## 2018-11-04 RX ADMIN — ROPIVACAINE HYDROCHLORIDE PRN MCG: 2 INJECTION, SOLUTION EPIDURAL; INFILTRATION at 18:24

## 2018-11-04 RX ADMIN — ROPIVACAINE HYDROCHLORIDE PRN MCG: 2 INJECTION, SOLUTION EPIDURAL; INFILTRATION at 03:05

## 2018-11-04 RX ADMIN — ROPIVACAINE HYDROCHLORIDE PRN MCG: 2 INJECTION, SOLUTION EPIDURAL; INFILTRATION at 22:57

## 2018-11-04 RX ADMIN — ONDANSETRON HYDROCHLORIDE PRN MG: 2 INJECTION, SOLUTION INTRAMUSCULAR; INTRAVENOUS at 03:45

## 2018-11-04 RX ADMIN — DEXTROSE MONOHYDRATE, SODIUM CHLORIDE, AND POTASSIUM CHLORIDE PRN MLS/HR: 50; 4.5; 1.49 INJECTION, SOLUTION INTRAVENOUS at 19:08

## 2018-11-04 RX ADMIN — ONDANSETRON HYDROCHLORIDE PRN MG: 2 INJECTION, SOLUTION INTRAMUSCULAR; INTRAVENOUS at 18:24

## 2018-11-04 RX ADMIN — ROPIVACAINE HYDROCHLORIDE PRN MCG: 2 INJECTION, SOLUTION EPIDURAL; INFILTRATION at 13:07

## 2018-11-04 RX ADMIN — ENOXAPARIN SODIUM SCH MG: 100 INJECTION SUBCUTANEOUS at 10:10

## 2018-11-04 RX ADMIN — ACETAMINOPHEN SCH MLS/HR: 10 INJECTION, SOLUTION INTRAVENOUS at 01:21

## 2018-11-04 RX ADMIN — DEXTROSE MONOHYDRATE, SODIUM CHLORIDE, AND POTASSIUM CHLORIDE PRN MLS/HR: 50; 4.5; 1.49 INJECTION, SOLUTION INTRAVENOUS at 09:48

## 2018-11-04 RX ADMIN — ONDANSETRON HYDROCHLORIDE PRN MG: 2 INJECTION, SOLUTION INTRAMUSCULAR; INTRAVENOUS at 07:53

## 2018-11-04 RX ADMIN — ROPIVACAINE HYDROCHLORIDE PRN MCG: 2 INJECTION, SOLUTION EPIDURAL; INFILTRATION at 07:27

## 2018-11-04 RX ADMIN — PANTOPRAZOLE SODIUM SCH MG: 40 INJECTION, POWDER, FOR SOLUTION INTRAVENOUS at 10:08

## 2018-11-04 RX ADMIN — ENOXAPARIN SODIUM SCH MG: 100 INJECTION SUBCUTANEOUS at 20:43

## 2018-11-04 RX ADMIN — ROPIVACAINE HYDROCHLORIDE PRN MCG: 2 INJECTION, SOLUTION EPIDURAL; INFILTRATION at 20:44

## 2018-11-04 RX ADMIN — LEVOTHYROXINE SODIUM ANHYDROUS SCH MCG: 100 INJECTION, POWDER, LYOPHILIZED, FOR SOLUTION INTRAVENOUS at 10:09

## 2018-11-04 RX ADMIN — ACETAMINOPHEN SCH MLS/HR: 10 INJECTION, SOLUTION INTRAVENOUS at 10:10

## 2018-11-04 RX ADMIN — ROPIVACAINE HYDROCHLORIDE PRN MCG: 2 INJECTION, SOLUTION EPIDURAL; INFILTRATION at 15:17

## 2018-11-04 RX ADMIN — DEXTROSE MONOHYDRATE, SODIUM CHLORIDE, AND POTASSIUM CHLORIDE PRN MLS/HR: 50; 4.5; 1.49 INJECTION, SOLUTION INTRAVENOUS at 01:17

## 2018-11-04 RX ADMIN — LISINOPRIL SCH MG: 20 TABLET ORAL at 20:44

## 2018-11-04 RX ADMIN — AMLODIPINE BESYLATE SCH MG: 5 TABLET ORAL at 10:10

## 2018-11-04 RX ADMIN — ACETAMINOPHEN SCH MLS/HR: 10 INJECTION, SOLUTION INTRAVENOUS at 17:36
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
## 2018-11-04 NOTE — RADIOLOGY IMAGING REPORT
FACILITY: [facility] [place] 

 

PATIENT NAME: Adán Ford

: 1945

MR: [id number]

V: [id number]

EXAM DATE: 

ORDERING PHYSICIAN: DAVID ZAMUDIO

TECHNOLOGIST: 

 

Location: [facility] [place]

Patient: Adán Ford

: 1945

MRN: [id number]

Visit/Account:[id number]

Date of Sevice: 2018

 

ACCESSION #: [id number]

 

EXAMINATION: Portable AP Chest

 

HISTORY: NGT placement

 

COMPARISON: 2018.

 

FINDINGS:

NG tube present. The distal tube is suboptimally visualized due to technique but likely terminates ne
ar the GE junction.

 

Stable chronic elevation of the right hemidiaphragm. Mild scarring or atelectasis in the lung bases. 
No suspicious focal consolidation. No pleural effusion or pneumothorax.

 

Normal cardiomediastinal silhouette. Aortic calcification.

 

No acute osseous findings. Partially visualized left shoulder arthroplasty.

 

IMPRESSION:

1. The distal NG tube is poorly visualized but likely terminates near the GE junction. A follow-up ab
dominal radiograph may allow for better visualization of the tip of the NG tube.

2. Mild scarring or atelectasis in the lung bases, with stable chronic elevation of the right hemidia
phragm.

 

Report Dictated By: Giovanni Archibald MD at 2018 2:27 AM

 

Report E-Signed By: Giovanni Archibald MD  at 2018 2:30 AM

 

WSN:[id number]

## 2018-11-04 NOTE — RADIOLOGY IMAGING REPORT
FACILITY: [facility] 

 

PATIENT NAME: Adán Ford

: 1945

MR: [id number]

V: [id number]

EXAM DATE: 

ORDERING PHYSICIAN: DAVID ZAMUDIO

TECHNOLOGIST: 

 

Location: [facility] [place]

Patient: Adán Ford

: 1945

MRN: [id number]

Visit/Account:[id number]

Date of Sevice: 2018

 

ACCESSION #: [id number]

 

CHEST SINGLE AP 2018 04:06 hours.

 

HISTORY: NG tube advancement. Check position.

 

COMPARISON: Earlier same day at 0206 hours and studies dating to 10/1/2006.

 

TECHNIQUE: Portable AP view of the chest.

 

FINDINGS:

 

Tubes/lines/hardware: NG tube terminates in the right upper quadrant, in the expected location of the
 body of the stomach. There is a left shoulder arthroplasty.

 

Pulmonary: There is mild right hemidiaphragm elevation with adjacent mild atelectasis or scarring, un
changed. Left lung is clear. There is no pneumothorax or pleural effusion.

 

Cardiomediastinal: Cardiac and mediastinal silhouettes are within normal limits. There is mild aortic
 calcification.

 

Bones/soft tissues: No acute osseous abnormality. The visible abdomen is normal.

 

IMPRESSION:

1. NG tube has been advanced with tip terminates in the right upper quadrant, in the expected locatio
n of the gastric body.

2. Mild right basilar atelectasis versus scarring adjacent to the elevated right hemidiaphragm, uncha
nged.

 

Report Dictated By: Lyssa Arredondo at 2018 4:27 AM

 

Report E-Signed By: Lyssa Arredondo  at 2018 4:31 AM

 

WSN:[id number]

## 2018-11-04 NOTE — GEN SURGERY HISTORY & PHYSICAL
History of Present Illness


Chief Complaint


Abdmonial distention, pain, N/V


History of Present Illness


Mr. Ford is a 74yo male presents with abdominal pain since early this 


morning.  He began vomiting this evening.  Complains of central lower abdominal 


pain where his previous bowel obstructions have been.  Denies chest pain, 


shortness of breath, fever, chills or dysuria.  Patient was seen approximately 3


weeks ago and had a CT scan of his abdomen and pelvis at that time.  The CT scan


showed an ileus.  He last had an obstruction requiring admission in 2016 and 


states that he has been having one about every year up to that point.  He states


that he was eating some celery sticks prior to this visit.  He has not had any 


fevers or chills.





History


Problems:  


(1) Osteoarthritis


Status:  Acute


(2) Hyperlipidemia


Status:  Acute


(3) Small bowel obstruction due to adhesions


Status:  Acute


(4) Aortic stenosis


(5) Pulmonary hypertension


(6) Anxiety and depression


(7) Renal insufficiency


Status:  Acute


(8) Gout


(9) DVT (deep venous thrombosis)


(10) Hypertension, benign


Status:  Chronic


(11) Chronic kidney disease


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


Status:  Acute


Home Meds


Active Scripts


Hydrocodone Bit/Acetaminophen (NORCO 5-325 TABLET) 1 Each Tablet, 1 EACH PO Q4H 


PRN for PAIN, #12 TAB


   Prov:SORAIDA MEDEIROS DO         10/10/18


Ondansetron (ZOFRAN ODT) 4 Mg Tab.rapdis, 4 MG PO every 6 hours PRN for 


NAUSEA/VOMITING, #10 TAB


   TAKE 1 TABLET BY MOUTH EVERY 12 HOURS


   Prov:SORAIDA MEDEIROS DO         10/10/18


Febuxostat (ULORIC) 40 Mg Tablet, 40 MG PO QDAY, #30 TAB 11 Refills


   Prov:OMARI DREW MD         10/4/18


Lorazepam (ATIVAN) 1 Mg Tablet, 1 MG PO Q8-12H PRN for ANXIETY, #30 TAB


   Prov:OMARI DREW MD         18


Amlodipine Besylate (AMLODIPINE BESYLATE) 5 Mg Tablet, 1 TAB PO QDAY, #90 TAB 3 


Refills


   Prov:OMARI DREW MD         18


Lisinopril (LISINOPRIL) 20 Mg Tablet, 20 MG PO BID, #180 TAB 3 Refills


   Prov:OMARI DREW MD         18


Pantoprazole Sodium (PANTOPRAZOLE SODIUM) 40 Mg Tablet.dr, 40 MG PO QDAY, #90 


TAB.SR 3 Refills


   Prov:OMARI DREW MD         3/21/18


Levothyroxine Sodium (LEVOTHYROXINE SODIUM) 75 Mcg Tablet, 75 MCG PO QDAY, #90 


TAB 3 Refills


   Prov:OMARI DREW MD         17


Reported Medications


Tramadol Hcl (TRAMADOL HCL) 50 Mg Tablet, 100 MG PO BID, TAB


   18


Docusate Sodium (COLACE) 100 Mg Capsule, 100 MG PO PRN, CAPSULE


   18


Warfarin Sodium (WARFARIN SODIUM) 5 Mg Tablet, 7.5 MG PO M,W,F, TAB


   10/10/18


Colchicine (COLCRYS) 0.6 Mg Tablet, 0.6 MG PO BID PRN for FOR GOUT ATTACK


   10/4/18


Warfarin Sodium (COUMADIN) 5 Mg Tablet, 5 MG PO SUN,TUE,THURS,SAT


   10/4/18


Discontinued Scripts


Docusate Sodium (COLACE) 100 Mg Capsule, 100 MG PO BID for 7 Days, #14 CAPSULE


   Prov:BJORN VALDERRAMA MD         10/1/18


Tramadol Hcl (TRAMADOL HCL) 50 Mg Tablet, 1 TAB PO QID for for chronic back pain


, #120 TAB 5 Refills


   Prov:OMARI DREW MD         18


Allergies:  


Coded Allergies:  


     morphine (Unverified  Adverse Reaction, Intermediate, VOMITING, 11/3/18)


Patient History:  


FH: CVA (cerebrovascular accident)


  MOTHER, , Age:75


FH: cancer


  FATHER, , Age:66


FH: dementia


  FATHER, , Age:66


  SISTER, Age:65, Onset:55


FH: diabetes mellitus


  FATHER, , Age:66


  BROTHER, 


  SISTER


  SISTER


FH: kidney failure


  BROTHER, 





Review of Systems


All Systems Reviewed/Normal:  Yes, Except as Noted


Constitutional:  No Fever


Gastrointestinal:  Nausea, Vomiting, Abdominal Pain





Exam


General Appearance:  Alert, Awake, No Acute Distress


Eyes:  PERRLA


ENT:  Normal


Cardiovascular:  Normal Rhythm & Peripheral Pulses


Respiratory:  No Respiratory Distress


GI:  Other (distended, tender lower midline)


Musculoskeletal:  No Weakness/Pain


Extremities:  Soft and Non Tender


Integumentary:  Skin Intact without Lesion / Mass


Psych:  Alert & Oriented X3, Appropriate Mood & Affect





Medical Decision Making


Data Points


Result Diagram:  


11/3/18 2217                                                                    


           11/3/18 2217








Assessment and Plan


Problems:  


(1) Small bowel obstruction due to adhesions


Status:  Acute


Assessment & Plan:  2018: Small bowel obstruction confirmed by CT.  An NGT


will be placed in the ED.  SBFT in the am.  I discussed the findings and plan 


with the patient and his children.  They expressed their understanding and gave 


consent to proceed.  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








Venous Thromboembolism


VTE Risk


Physician Assess for VTE Risk:  Yes


Patient's VTE Risk:  High





VTE Diagnostic Test


2 Days Prior to Admit:  No





Antithrombotics


Is Pt On Any Antithrombotics?:  Yes











DAVID ZAMUDIO MD           2018 01:15

## 2018-11-04 NOTE — GENERAL SURGERY PROGRESS NOTE
Subjective


Progress Notes


Subjective


Better but not resolved.





Physical Exam





Vital Signs








  Date Time  Temp Pulse Resp B/P (MAP) Pulse Ox O2 Delivery O2 Flow Rate FiO2


 


11/4/18 07:34 98.4 83 22 120/70 (87) 91 Nasal Cannula 3.0 














Intake and Output 


 


 11/4/18





 07:00


 


Intake Total 1020 ml


 


Output Total 800 ml


 


Balance 220 ml


 


 


 


Intake IV Total 1000 ml


 


Tube Feeding 20 ml


 


Output Gastric Drainage Total 800 ml


 


# Voids 1








General Appearance:  Alert, Awake, No Acute Distress


GI:  Soft and Non-Tender (sore but improved from last pm)


Extremities:  Soft and Non Tender


Psych:  Alert & Oriented X3, Appropriate Mood & Affect


Result Diagram:  


11/4/18 0622 11/4/18 0622








Assessment and Plan


Problems:  


(1) Small bowel obstruction due to adhesions


Status:  Acute


Assessment & Plan:  11/04/2018: Small bowel obstruction confirmed by CT.  An NGT


will be placed in the ED.  SBFT in the am.  I discussed the findings and plan 


with the patient and his children.  They expressed their understanding and gave 


consent to proceed.  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


11/05/2018: SBO persists.  SBFT today.





Time Spent:  < 30 min





Exam


Sepsis Risk:  No Definite Risk











DAVID ZAMUDIO MD           Nov 4, 2018 10:14

## 2018-11-05 VITALS — SYSTOLIC BLOOD PRESSURE: 117 MMHG | DIASTOLIC BLOOD PRESSURE: 81 MMHG

## 2018-11-05 VITALS — SYSTOLIC BLOOD PRESSURE: 118 MMHG | DIASTOLIC BLOOD PRESSURE: 78 MMHG

## 2018-11-05 VITALS — DIASTOLIC BLOOD PRESSURE: 78 MMHG | SYSTOLIC BLOOD PRESSURE: 114 MMHG

## 2018-11-05 RX ADMIN — ROPIVACAINE HYDROCHLORIDE PRN MCG: 2 INJECTION, SOLUTION EPIDURAL; INFILTRATION at 03:51

## 2018-11-05 RX ADMIN — ACETAMINOPHEN SCH MLS/HR: 10 INJECTION, SOLUTION INTRAVENOUS at 00:33

## 2018-11-05 RX ADMIN — ROPIVACAINE HYDROCHLORIDE PRN MCG: 2 INJECTION, SOLUTION EPIDURAL; INFILTRATION at 05:25

## 2018-11-05 RX ADMIN — DEXTROSE MONOHYDRATE, SODIUM CHLORIDE, AND POTASSIUM CHLORIDE PRN MLS/HR: 50; 4.5; 1.49 INJECTION, SOLUTION INTRAVENOUS at 05:39

## 2018-11-05 RX ADMIN — LISINOPRIL SCH MG: 20 TABLET ORAL at 09:17

## 2018-11-05 RX ADMIN — ROPIVACAINE HYDROCHLORIDE PRN MCG: 2 INJECTION, SOLUTION EPIDURAL; INFILTRATION at 09:02

## 2018-11-05 RX ADMIN — ROPIVACAINE HYDROCHLORIDE PRN MCG: 2 INJECTION, SOLUTION EPIDURAL; INFILTRATION at 11:49

## 2018-11-05 RX ADMIN — ACETAMINOPHEN SCH MLS/HR: 10 INJECTION, SOLUTION INTRAVENOUS at 09:01

## 2018-11-05 RX ADMIN — PANTOPRAZOLE SODIUM SCH MG: 40 INJECTION, POWDER, FOR SOLUTION INTRAVENOUS at 09:12

## 2018-11-05 RX ADMIN — ROPIVACAINE HYDROCHLORIDE PRN MCG: 2 INJECTION, SOLUTION EPIDURAL; INFILTRATION at 07:08

## 2018-11-05 RX ADMIN — AMLODIPINE BESYLATE SCH MG: 5 TABLET ORAL at 09:17

## 2018-11-05 RX ADMIN — ENOXAPARIN SODIUM SCH MG: 100 INJECTION SUBCUTANEOUS at 09:17

## 2018-11-05 RX ADMIN — LEVOTHYROXINE SODIUM ANHYDROUS SCH MCG: 100 INJECTION, POWDER, LYOPHILIZED, FOR SOLUTION INTRAVENOUS at 09:07

## 2018-11-05 NOTE — RADIOLOGY IMAGING REPORT
FACILITY: [facility] [place] 

 

PATIENT NAME: Adán Ford

: 1945

MR: [id number]

V: [id number]

EXAM DATE: 

ORDERING PHYSICIAN: DAVID ZAMUDIO

TECHNOLOGIST: 

 

Location: [facility] [place]

Patient: Adán Ford

: 1945

MRN: [id number]

Visit/Account:[id number]

Date of Sevice: 2018

 

ACCESSION #: [id number]

 

SMALL BOWEL SERIES

 

Indication: Small Bowel Obstruction

 

Comparison: None.

 

Findings: On the  image, normal bowel gas pattern is seen.

 

A small bowel follow-through was then performed, after the placement of Gastrografin in the nasogastr
ic tube.  Contrast is seen in the colon at 1 hour.

 

IMPRESSION: Normal small bowel follow-through.

 

Report Dictated By: Siva Zimmerman at 2018 11:06 AM

 

Report E-Signed By: Siva Zimmerman  at 2018 11:09 AM

 

WSN:[id number]

## 2018-11-05 NOTE — HOSPITALIST DEPART
Discharge Summary


Reason for Hosp/Final Diag:  


(1) Small bowel obstruction due to adhesions


Status:  Acute


Hospital Course & Plan:  11/04/2018: Small bowel obstruction confirmed by CT.  


An NGT will be placed in the ED.  SBFT in the am.  I discussed the findings and 


plan with the patient and his children.  They expressed their understanding and 


gave consent to proceed.  Continue therapeutic lovenox for his history of DVT


/PE.


11/05/2018: SBO persists.  SBFT today.


          1045am - Contrast through colon within 1 hr.  Discharge to home.  


Follow-up as needed.  Counseled to avoid large pieces of raw vegetables/fruits, 


nuts and steak.





Departure


Weight (Pounds):  183


Weight (Ounces):  12.0


Result Diagram:  


11/4/18 0622 11/4/18 0622





Condition:  Improved


Discharge:  Home


Discharge Code Status:  Full Code


Time Spent:  < 30 min





Discharge Instructions


Home Meds


Active Scripts


Hydrocodone Bit/Acetaminophen (NORCO 5-325 TABLET) 1 Each Tablet, 1 EACH PO Q4H 


PRN for PAIN, #12 TAB


   Prov:SORAIDA MEDEIROS DO         10/10/18


Ondansetron (ZOFRAN ODT) 4 Mg Tab.rapdis, 4 MG PO every 6 hours PRN for 


NAUSEA/VOMITING, #10 TAB


   TAKE 1 TABLET BY MOUTH EVERY 12 HOURS


   Prov:SORAIDA MEDEIROS DO         10/10/18


Febuxostat (ULORIC) 40 Mg Tablet, 40 MG PO QDAY, #30 TAB 11 Refills


   Prov:OMARI DREW MD         10/4/18


Lorazepam (ATIVAN) 1 Mg Tablet, 1 MG PO Q8-12H PRN for ANXIETY, #30 TAB


   Prov:OMARI DREW MD         8/29/18


Amlodipine Besylate (AMLODIPINE BESYLATE) 5 Mg Tablet, 1 TAB PO QDAY, #90 TAB 3 


Refills


   Prov:OMARI DREW MD         8/9/18


Lisinopril (LISINOPRIL) 20 Mg Tablet, 20 MG PO BID, #180 TAB 3 Refills


   Prov:OMARI DREW MD         5/17/18


Pantoprazole Sodium (PANTOPRAZOLE SODIUM) 40 Mg Tablet.dr, 40 MG PO QDAY, #90 


TAB.SR 3 Refills


   Prov:OMARI DREW MD         3/21/18


Levothyroxine Sodium (LEVOTHYROXINE SODIUM) 75 Mcg Tablet, 75 MCG PO QDAY, #90 


TAB 3 Refills


   Prov:OMARI DREW MD         9/20/17


Reported Medications


Tramadol Hcl (TRAMADOL HCL) 50 Mg Tablet, 100 MG PO BID, TAB


   11/4/18


Docusate Sodium (COLACE) 100 Mg Capsule, 100 MG PO PRN, CAPSULE


   11/4/18


Warfarin Sodium (WARFARIN SODIUM) 5 Mg Tablet, 7.5 MG PO CAO,WE, TAB


   10/10/18


Colchicine (COLCRYS) 0.6 Mg Tablet, 0.6 MG PO BID PRN for FOR GOUT ATTACK


   10/4/18


Warfarin Sodium (COUMADIN) 5 Mg Tablet, 5 MG PO MON,TUE,THUR,FRI,SAT


   10/4/18


Discontinued Scripts


Docusate Sodium (COLACE) 100 Mg Capsule, 100 MG PO BID for 7 Days, #14 CAPSULE


   Prov:BJORN VALDERRAMA MD         10/1/18


Tramadol Hcl (TRAMADOL HCL) 50 Mg Tablet, 1 TAB PO QID for for chronic back 


pain, #120 TAB 5 Refills


   Prov:OMARI DREW MD         8/9/18


Diet:  Regular


Activity:  As Tolerated





Venous Thromboembolism


Antithrombotics


Is Pt On Any Antithrombotics?:  Yes











DAVID ZAMUDIO MD           Nov 5, 2018 10:48

## 2018-12-12 ENCOUNTER — HOSPITAL ENCOUNTER (OUTPATIENT)
Dept: HOSPITAL 89 - LAB | Age: 73
End: 2018-12-12
Attending: INTERNAL MEDICINE
Payer: MEDICARE

## 2018-12-12 VITALS — BODY MASS INDEX: 27.82 KG/M2

## 2018-12-12 DIAGNOSIS — E78.5: ICD-10-CM

## 2018-12-12 DIAGNOSIS — E03.9: ICD-10-CM

## 2018-12-12 DIAGNOSIS — I10: ICD-10-CM

## 2018-12-12 DIAGNOSIS — Z86.711: ICD-10-CM

## 2018-12-12 DIAGNOSIS — I82.409: ICD-10-CM

## 2018-12-12 DIAGNOSIS — R94.31: ICD-10-CM

## 2018-12-12 DIAGNOSIS — Z01.818: Primary | ICD-10-CM

## 2018-12-12 DIAGNOSIS — I44.0: ICD-10-CM

## 2018-12-12 LAB
INR PPP: 2.36
PLATELET COUNT, AUTOMATED: 213 K/UL (ref 150–450)

## 2018-12-12 PROCEDURE — 82947 ASSAY GLUCOSE BLOOD QUANT: CPT

## 2018-12-12 PROCEDURE — 82310 ASSAY OF CALCIUM: CPT

## 2018-12-12 PROCEDURE — 84443 ASSAY THYROID STIM HORMONE: CPT

## 2018-12-12 PROCEDURE — 84460 ALANINE AMINO (ALT) (SGPT): CPT

## 2018-12-12 PROCEDURE — 85025 COMPLETE CBC W/AUTO DIFF WBC: CPT

## 2018-12-12 PROCEDURE — 84132 ASSAY OF SERUM POTASSIUM: CPT

## 2018-12-12 PROCEDURE — 85730 THROMBOPLASTIN TIME PARTIAL: CPT

## 2018-12-12 PROCEDURE — 82435 ASSAY OF BLOOD CHLORIDE: CPT

## 2018-12-12 PROCEDURE — 84450 TRANSFERASE (AST) (SGOT): CPT

## 2018-12-12 PROCEDURE — 84295 ASSAY OF SERUM SODIUM: CPT

## 2018-12-12 PROCEDURE — 84155 ASSAY OF PROTEIN SERUM: CPT

## 2018-12-12 PROCEDURE — 82374 ASSAY BLOOD CARBON DIOXIDE: CPT

## 2018-12-12 PROCEDURE — 84550 ASSAY OF BLOOD/URIC ACID: CPT

## 2018-12-12 PROCEDURE — 82040 ASSAY OF SERUM ALBUMIN: CPT

## 2018-12-12 PROCEDURE — 84520 ASSAY OF UREA NITROGEN: CPT

## 2018-12-12 PROCEDURE — 84075 ASSAY ALKALINE PHOSPHATASE: CPT

## 2018-12-12 PROCEDURE — 82565 ASSAY OF CREATININE: CPT

## 2018-12-12 PROCEDURE — 85610 PROTHROMBIN TIME: CPT

## 2018-12-12 PROCEDURE — 82247 BILIRUBIN TOTAL: CPT

## 2018-12-12 NOTE — EKG
FACILITY: [facility] 

 

PATIENT NAME: ROSCOE HARLEY

: [id number]

MR: [id number]

V: [id number]

EXAM DATE: 

ORDERING PHYSICIAN: OMARI DREW

TECHNOLOGIST: SRNG

 

Test Reason : PRE-OP

Blood Pressure : ***/*** mmHG

Vent. Rate : 083 BPM     Atrial Rate : 083 BPM

   P-R Int : 220 ms          QRS Dur : 104 ms

    QT Int : 386 ms       P-R-T Axes : 045 -70 068 degrees

   QTc Int : 453 ms

 

Sinus rhythm with 1st degree AV block

Left axis deviation

Inferior-posterior infarct (cited on or before 29-AUG-2018)

Abnormal ECG

When compared with ECG of 29-AUG-2018 13:51,

Right bundle branch block is no longer present

Confirmed by OMARI DREW ([id number]) on 2018 4:21:08 PM

 

Referred By:  KAYLEIGH           Confirmed By:OMARI DREW

## 2018-12-18 LAB — INR PPP: 0.99

## 2018-12-18 NOTE — RADIOLOGY IMAGING REPORT
FACILITY: [facility] 

 

PATIENT NAME: Adán Ford

: 1945

MR: [id number]

V: [id number]

EXAM DATE: 

ORDERING PHYSICIAN: ARIANA ESCOTO

TECHNOLOGIST: 

 

[place] [facility] [place] Adán [place]

: 1945

MRN: [id number]

Visit/Account:[id number]

Date of Sevice: 2018

 

ACCESSION #: [id number]

 

LEGS BILAT STANDING HIPS-ANKLE

 

Indication: L KNEE ARTHROPLASTY

 

Comparison: None.

 

Findings:

 

Right leg length: 84.6 cm.  This is measured from the cephalad portion of the femoral head to the tib
ial distal articular surface.  Right femur measurement from the superior margin of the femoral head t
o the medial femoral condyle is 48.2 cm..  The right tibia measures 35.6 cm from the medial tibial pl
ateau to the medial tibial plafond.

 

Left leg measurement: 84.4 cm.  The left femur measures 48 cm from the superior margin of the left fe
moral head to the medial femoral condyle.  The left tibia measures 36.2 cm from the medial tibial keven
teau to the medial tibial plafond.

 

No acute osseous amount is seen in the right or left lower extremity.

 

Impression: Right and left leg length measurements.

 

Report Dictated By: Siva Zimmerman at 2018 3:12 PM

 

Report E-Signed By: Siva Zimmerman  at 2018 3:18 PM

 

WSN:[email or web address]

## 2018-12-19 ENCOUNTER — HOSPITAL ENCOUNTER (INPATIENT)
Dept: HOSPITAL 89 - OR | Age: 73
LOS: 1 days | Discharge: HOME | DRG: 470 | End: 2018-12-20
Attending: ORTHOPAEDIC SURGERY | Admitting: ORTHOPAEDIC SURGERY
Payer: MEDICARE

## 2018-12-19 VITALS — DIASTOLIC BLOOD PRESSURE: 69 MMHG | SYSTOLIC BLOOD PRESSURE: 109 MMHG

## 2018-12-19 VITALS — DIASTOLIC BLOOD PRESSURE: 67 MMHG | SYSTOLIC BLOOD PRESSURE: 111 MMHG

## 2018-12-19 VITALS — DIASTOLIC BLOOD PRESSURE: 7 MMHG | SYSTOLIC BLOOD PRESSURE: 97 MMHG

## 2018-12-19 VITALS — SYSTOLIC BLOOD PRESSURE: 103 MMHG | DIASTOLIC BLOOD PRESSURE: 68 MMHG

## 2018-12-19 VITALS — DIASTOLIC BLOOD PRESSURE: 67 MMHG | SYSTOLIC BLOOD PRESSURE: 107 MMHG

## 2018-12-19 VITALS — SYSTOLIC BLOOD PRESSURE: 96 MMHG | DIASTOLIC BLOOD PRESSURE: 66 MMHG

## 2018-12-19 VITALS — SYSTOLIC BLOOD PRESSURE: 111 MMHG | DIASTOLIC BLOOD PRESSURE: 67 MMHG

## 2018-12-19 VITALS — DIASTOLIC BLOOD PRESSURE: 69 MMHG | SYSTOLIC BLOOD PRESSURE: 111 MMHG

## 2018-12-19 VITALS — SYSTOLIC BLOOD PRESSURE: 93 MMHG | DIASTOLIC BLOOD PRESSURE: 66 MMHG

## 2018-12-19 VITALS — SYSTOLIC BLOOD PRESSURE: 93 MMHG | DIASTOLIC BLOOD PRESSURE: 55 MMHG

## 2018-12-19 VITALS — SYSTOLIC BLOOD PRESSURE: 101 MMHG | DIASTOLIC BLOOD PRESSURE: 67 MMHG

## 2018-12-19 VITALS — DIASTOLIC BLOOD PRESSURE: 69 MMHG | SYSTOLIC BLOOD PRESSURE: 104 MMHG

## 2018-12-19 VITALS — DIASTOLIC BLOOD PRESSURE: 59 MMHG | SYSTOLIC BLOOD PRESSURE: 99 MMHG

## 2018-12-19 VITALS
HEIGHT: 69 IN | BODY MASS INDEX: 26.66 KG/M2 | WEIGHT: 180 LBS | WEIGHT: 180 LBS | BODY MASS INDEX: 26.66 KG/M2 | HEIGHT: 69 IN

## 2018-12-19 VITALS — SYSTOLIC BLOOD PRESSURE: 94 MMHG | DIASTOLIC BLOOD PRESSURE: 61 MMHG

## 2018-12-19 VITALS — SYSTOLIC BLOOD PRESSURE: 101 MMHG | DIASTOLIC BLOOD PRESSURE: 63 MMHG

## 2018-12-19 VITALS — SYSTOLIC BLOOD PRESSURE: 119 MMHG | DIASTOLIC BLOOD PRESSURE: 62 MMHG

## 2018-12-19 VITALS — SYSTOLIC BLOOD PRESSURE: 90 MMHG | DIASTOLIC BLOOD PRESSURE: 56 MMHG

## 2018-12-19 VITALS — SYSTOLIC BLOOD PRESSURE: 103 MMHG | DIASTOLIC BLOOD PRESSURE: 58 MMHG

## 2018-12-19 DIAGNOSIS — I10: ICD-10-CM

## 2018-12-19 DIAGNOSIS — Z86.718: ICD-10-CM

## 2018-12-19 DIAGNOSIS — Z88.5: ICD-10-CM

## 2018-12-19 DIAGNOSIS — E03.9: ICD-10-CM

## 2018-12-19 DIAGNOSIS — M17.12: Primary | ICD-10-CM

## 2018-12-19 DIAGNOSIS — Z79.01: ICD-10-CM

## 2018-12-19 PROCEDURE — 85610 PROTHROMBIN TIME: CPT

## 2018-12-19 PROCEDURE — 85014 HEMATOCRIT: CPT

## 2018-12-19 PROCEDURE — 86901 BLOOD TYPING SEROLOGIC RH(D): CPT

## 2018-12-19 PROCEDURE — 85025 COMPLETE CBC W/AUTO DIFF WBC: CPT

## 2018-12-19 PROCEDURE — 86900 BLOOD TYPING SEROLOGIC ABO: CPT

## 2018-12-19 PROCEDURE — 77073 BONE LENGTH STUDIES: CPT

## 2018-12-19 PROCEDURE — 36415 COLL VENOUS BLD VENIPUNCTURE: CPT

## 2018-12-19 PROCEDURE — 0SRD0L9 REPLACEMENT OF LEFT KNEE JOINT WITH MEDIAL UNICONDYLAR SYNTHETIC SUBSTITUTE, CEMENTED, OPEN APPROACH: ICD-10-PCS | Performed by: ORTHOPAEDIC SURGERY

## 2018-12-19 PROCEDURE — 97161 PT EVAL LOW COMPLEX 20 MIN: CPT

## 2018-12-19 PROCEDURE — 86850 RBC ANTIBODY SCREEN: CPT

## 2018-12-19 PROCEDURE — 76942 ECHO GUIDE FOR BIOPSY: CPT

## 2018-12-19 PROCEDURE — 85018 HEMOGLOBIN: CPT

## 2018-12-19 RX ADMIN — SODIUM CHLORIDE, SODIUM ACETATE ANHYDROUS, SODIUM GLUCONATE, POTASSIUM CHLORIDE, AND MAGNESIUM CHLORIDE PRN MLS/HR: 526; 222; 502; 37; 30 INJECTION, SOLUTION INTRAVENOUS at 14:44

## 2018-12-19 RX ADMIN — LISINOPRIL SCH MG: 20 TABLET ORAL at 21:00

## 2018-12-19 RX ADMIN — SODIUM CHLORIDE, SODIUM ACETATE ANHYDROUS, SODIUM GLUCONATE, POTASSIUM CHLORIDE, AND MAGNESIUM CHLORIDE PRN MLS/HR: 526; 222; 502; 37; 30 INJECTION, SOLUTION INTRAVENOUS at 07:21

## 2018-12-19 NOTE — HOSPITALIST CONSULTATION
History of Present Illness


Requesting Physician


Dr. Hinojosa


Reason for Consult


Medical Management


Chief Complaint


s/p left knee replacement


History of Present Illness


He was admitted s/p left knee replacement.  It is reported the surgery went well


and without complication.





History


Problems:  


(1) Hypothyroidism


Status:  Chronic


(2) Hypertension, benign


Status:  Chronic


(3) DVT (deep venous thrombosis)


Home Meds


Active Scripts


Ondansetron (ZOFRAN ODT) 4 Mg Tab.rapdis, 4 MG PO every 6 hours PRN for 


NAUSEA/VOMITING, #10 TAB


   TAKE 1 TABLET BY MOUTH EVERY 12 HOURS


   Prov:SORAIDA MEDEIROS DO         10/10/18


Febuxostat (ULORIC) 40 Mg Tablet, 40 MG PO QDAY, #30 TAB 11 Refills


   Prov:OMARI DREW MD         10/4/18


Lisinopril (LISINOPRIL) 20 Mg Tablet, 20 MG PO BID, #180 TAB 3 Refills


   Prov:OMARI DREW MD         18


Pantoprazole Sodium (PANTOPRAZOLE SODIUM) 40 Mg Tablet.dr, 40 MG PO QDAY, #90 


TAB.SR 3 Refills


   Prov:OMRAI DREW MD         3/21/18


Levothyroxine Sodium (LEVOTHYROXINE SODIUM) 75 Mcg Tablet, 75 MCG PO QDAY, #90 


TAB 3 Refills


   Prov:OMARI DREW MD         17


Reported Medications


Enoxaparin Sodium (LOVENOX) 80 Mg/0.8 Ml Disp.syrin, 80 MG SQ BID


   18


Warfarin Sodium (WARFARIN SODIUM) 5 Mg Tablet, 5 MG PO QDAY, TAB


   18


Vitamin B Complex (VITAMIN B COMPLEX) 1 Each Capsule, 1 EACH PO DAILY, CAPSULE


   18


Folic Acid (FOLIC ACID) 1 Mg Tablet, 1 MG PO QDAY, TAB


   18


Tramadol Hcl (TRAMADOL HCL) 50 Mg Tablet, 50 MG PO Q6H, TAB


   18


Docusate Sodium (COLACE) 100 Mg Capsule, 100 MG PO PRN, CAPSULE


   18


Colchicine (COLCRYS) 0.6 Mg Tablet, 0.6 MG PO BID PRN for FOR GOUT ATTACK


   10/4/18


Discontinued Reported Medications


Warfarin Sodium (WARFARIN SODIUM) 5 Mg Tablet, 7.5 MG PO CAO,WE, TAB


   10/10/18


Warfarin Sodium (COUMADIN) 5 Mg Tablet, 5 MG PO MON,TUE,THUR,FRI,SAT


   10/4/18


Discontinued Scripts


Hydrocodone Bit/Acetaminophen (NORCO 5-325 TABLET) 1 Each Tablet, 1 EACH PO Q4H 


PRN for PAIN, #12 TAB


   Prov:SORAIDA MEDEIROS DO         10/10/18


Lorazepam (ATIVAN) 1 Mg Tablet, 1 MG PO Q8-12H PRN for ANXIETY, #30 TAB


   Prov:OMARI DREW MD         18


Amlodipine Besylate (AMLODIPINE BESYLATE) 5 Mg Tablet, 1 TAB PO QDAY, #90 TAB 3 


Refills


   Prov:OMARI DREW MD         18


Allergies:  


Coded Allergies:  


     morphine (Unverified  Adverse Reaction, Intermediate, VOMITING, 11/3/18)


Patient History:  


FH: CVA (cerebrovascular accident)


  MOTHER, , Age:75


FH: cancer


  FATHER, , Age:66


FH: dementia


  FATHER, , Age:66


  SISTER, Age:65, Onset:56


FH: diabetes mellitus


  FATHER, , Age:66


  BROTHER, 


  SISTER


  SISTER


FH: kidney failure


  BROTHER, 


Hx Smoking:  No


Smoking Status:  Never Smoker


Exposure to Second Hand Smoke?:  No


Caffeine Intake:  Soda


Caffeine/Cups Per Day:  2-3


Hx Alcohol Use:  No


Hx Substance Use Disorder:  No


Social Drug Use:  Never





Review of Systems


All Systems Reviewed/Normal:  Yes, Except as Noted





Exam


Vital Signs





Vital Signs








  Date Time  Temp Pulse Resp B/P (MAP) Pulse Ox O2 Delivery O2 Flow Rate FiO2


 


18 12:56     92 Oxy Mask 2.0 


 


18 12:35 97.8 85 16 94/61 (72)    








General Appearance:  No Acute Distress, Afebrile


Cardiovascular:  Regular Rate and Rhythm


Respiratory:  No Respiratory Distress, Clear to Auscultation


Psych:  Other (sleeping through exam)





Medical Decision Making


Data Points


Result Diagram:  


18 9314








Assessment and Plan


Problems:  


(1) Status post left knee replacement


Status:  Acute


Assessment & Plan:  He will resume his Warfarin and Lovenox in the morning. He 


does have history of DVT and PE.





(2) Hypothyroidism


Status:  Chronic


Assessment & Plan:  He is on chronic treatment with Levothyroxine. 





(3) Hypertension, benign


Status:  Chronic


Assessment & Plan:  He is on chronic treatment with Lisinopril. This has been 


restarted with hold parameters. 





(4) DVT (deep venous thrombosis)


Assessment & Plan:  DVT discovered in 2018. He is on chronic treatment 


with Warfarin. He has been covered with Lovenox. He will resume both Lovenox and


Warfarin in the morning. 








Venous Thromboembolism


Antithrombotics


Is Pt On Any Antithrombotics?:  Yes





Exam


Sepsis Risk:  No Definite Risk











TONI PATELP            Dec 19, 2018 14:22

## 2018-12-19 NOTE — OPERATIVE REPORT 1
EVENT DATE: December 19, 2018 

SURGEON: Garrett Hinojosa MD 

ANESTHESIOLOGIST: Ruben Montilla MD 

ANESTHESIA: General plus  IPACK and adductor canal block. 

ASSISTANT: MADONNA Rodriguez, RNFA 





PREOPERATIVE DIAGNOSIS  

Left knee medial compartment degenerative joint disease. 



POSTOPERATIVE DIAGNOSIS 

Left knee medial compartment degenerative joint disease. 



PROCEDURE PERFORMED 

Left unicompartmental arthroplasty of the knee (60744). 



ESTIMATED BLOOD LOSS 

75 cc



IV FLUIDS 

1800 cc Crystalloid, no colloid.  



TOURNIQUET TIME 

63 minutes at 300 mmHg 



SPECIMENS 

None.  



COMPLICATIONS

None.  



IMPLANTS USED 

[place] unicompartmental medial arthroplasty, size medium femur, size D left 

tibia, and a 3 mm insert.  



DESCRIPTION OF PROCEDURE 

The patient was brought into the operating room and placed on the OR table in 

the supine position.  Dr. Montilla then did an adductor canal block and an IPACK 

block after which he was given a light general anesthetic  and he was positioned

for prep and drape using the thigh claros.  We exsanguinated the limb and 

inflated the tourniquet initially to 275 mmHg and then up to 300 mmHg.  He does 

have a clotting factor disorder wherein he did require Coumadin with bridging 

Lovenox.  A standard medial approach was taken deep in through the skin and 

subcutaneous tissue by blunt spreading and then the capsulotomy was undertaken. 

The fluid was clear.  The anterior aspect of the tibial was exposed for our bone

cuts and we removed for the time being just the anterior meniscus.  We examined 

the ACL which was in good condition as well as the lateral compartment and the 

patellofemoral compartment, all of which were acceptable.  The osteophytes on 

the medial side were then removed, starting up at the superior medial aspect and

going around to the outside, but we took care not to damage the MCL or to loosen

it in anyway.  We also removed osteophytes anteriorly where they impinged in 

extension.  Once this was done, we sized an it looked like a 1 mm medium would 

work well.  We selected a size 4 block and did the tibial alignment guide, 

pinned this and then started with our vertical cut, followed by the transverse 

cut using an osteotome to elevate it and then pulled it out.  Measuring this 

size it looked like it fit well with the size D.  We then proceeded to osvaldo the 

center of the curvature on the femoral component with blue dye.  We used our 

size 4 sizing guide, and an IM jah and then pinned with the 4 mm followed by the

6 mm drill.  This was followed by placement of the posterior cut guide which was

undertaken.  This bone was removed.  We then went in and removed a bit more of 

the meniscus, but again, we took care not to destabilize the medial collateral 

ligament.  After this, we were able to do our first sizing trials.  Oddly, it 

was actually symmetric or nearly so.  We only had to do one spigot after we had 

done our initial milling.  We remilled and then did the anti-impingement 

milling.  We also removed a bit more osteophyte at this point.  We trialed again

and a 2 was a little bit loose, so a 3 which is actually the smallest final 

implant anyway was selected.  



We had prepared the tibial tray with the keel tray system including the dolphin 

blade, trialed one final time and found it to be acceptable with the 3.  We did 

adjacent drill holes along the keel hole for the tibia as well as peripherally 

around the femur and then mixed cement.  Irrigation was followed by drying and 

then placement of cement initially at the tibia and then at the femur, followed 

by the insert.  Since we were already as small as we could go with a 3, we did 

not do the trial, instead I just went straight to the final #3 and we held it at

45 degrees with compression until full polymerization.  The tourniquet was then 

deflated.  We did not use tranexamic acid due to his clotting disorder.  There 

was little additional bleeding.  We irrigated the wound again, checked for any 

residual cement, and then proceeded to close using #1 Vicryl for the quad and 

capsule, followed by repeat irrigation and closure with 3-0 Vicryl and 4-0 

Monocryl for the skin.  A Dermabond stip was applied.  He was given a dry, 

sterile dressing, and was transferred to the recovery area in stable condition. 

I had injected a local anesthetic throughout the knee before closure.   

MARIELENA

## 2018-12-19 NOTE — RADIOLOGY IMAGING REPORT
FACILITY: [facility] 

 

PATIENT NAME: Adán Ford

: 1945

MR: [id number]

V: [id number]

EXAM DATE: 

ORDERING PHYSICIAN: ARIANA ESCOTO

TECHNOLOGIST: 

 

Location: [facility]

Patient: Adán Ford

: 1945

MRN: [id number]

Visit/Account:[id number]

Date of Sevice: 2018

 

ACCESSION #: [id number]

 

Exam type: KNEE LIMITED LEFT

 

History: POST OP PLACEMENT

 

Comparison: None.

 

Findings:

 

There is a unicompartmental medial left knee prosthesis appears in good anatomic alignment on the AP 
and lateral view.  Soft tissue gas projects over the anterior aspect the left kidney

 

IMPRESSION:

 

1.  As above

 

Report Dictated By: Salina Pereira MD at 2018 12:15 PM

 

Report E-Signed By: Salina Pereira MD  at 2018 12:16 PM

 

WSN:[id number]

## 2018-12-20 VITALS — SYSTOLIC BLOOD PRESSURE: 124 MMHG | DIASTOLIC BLOOD PRESSURE: 68 MMHG

## 2018-12-20 VITALS — DIASTOLIC BLOOD PRESSURE: 58 MMHG | SYSTOLIC BLOOD PRESSURE: 92 MMHG

## 2018-12-20 VITALS — SYSTOLIC BLOOD PRESSURE: 105 MMHG | DIASTOLIC BLOOD PRESSURE: 69 MMHG

## 2018-12-20 LAB — PLATELET COUNT, AUTOMATED: 161 K/UL (ref 150–450)

## 2018-12-20 RX ADMIN — LISINOPRIL SCH MG: 20 TABLET ORAL at 08:17

## 2018-12-20 NOTE — HOSPITALIST PROGRESS NOTE
Subjective


Progress Notes


Subjective


He has no complaints this morning. He had no acute events overnight.





Patient Complains of:


Cardiovascular:  No: Chest Pain


Respiratory:  No: Shortness of Breath





Physical Exam





Vital Signs








  Date Time  Temp Pulse Resp B/P (MAP) Pulse Ox O2 Delivery O2 Flow Rate FiO2


 


12/20/18 08:14     87   


 


12/20/18 08:08      Room Air  


 


12/20/18 08:04 97.4 82 16 105/69 (81)    


 


12/20/18 03:46       2.0 














Intake and Output 


 


 12/20/18





 00:00


 


Intake Total 2310 ml


 


Output Total 75 ml


 


Balance 2235 ml


 


 


 


Intake Oral 360 ml


 


IV Total 1950 ml


 


Output Estimated Blood Loss 75 ml


 


# Voids 2








General Appearance:  Alert, Awake, No Acute Distress, Afebrile


Neuro:  No Gross deficits


Cardiovascular:  Regular Rate and Rhythm


Respiratory:  No Respiratory Distress, Clear to Auscultation


GI:  Soft and Non-Tender


Psych:  Alert & Oriented X3, Appropriate Mood & Affect


Result Diagram:  


12/20/18 0736








Assessment and Plan


Problems:  


(1) Status post left knee replacement


Status:  Acute


Assessment & Plan:  He will resume his Warfarin and Lovenox today. He does have 


history of DVT and PE. He will get INR checked on Monday at Dr. Austin's office.


He will continue Lovenox until INR check. 





(2) Hypothyroidism


Status:  Chronic


Assessment & Plan:  He is on chronic treatment with Levothyroxine. 





(3) Hypertension, benign


Status:  Chronic


Assessment & Plan:  He is on chronic treatment with Lisinopril. This has been 


restarted with hold parameters. 





(4) DVT (deep venous thrombosis)


Assessment & Plan:  DVT discovered in October 2018. He is on chronic treatment 


with Warfarin. He has been covered with Lovenox. He will resume both Lovenox and


Warfarin today. See above. 








Exam


Sepsis Risk:  No Definite Risk











TONI PATEL FNP            Dec 20, 2018 09:55

## 2018-12-28 NOTE — PT INITIAL EVALUATION
MEDICAL DIAGNOSIS: L knee medial unicompartmental

TREATMENT DIAGNOSIS: same, altered gait mechanics

DATE OF ONSET: 12/19/18



SUBJECTIVE: Adán Ford presents to physical therapy status post L knee medial 

unicompartmental replacement on December 19, 2018. He reports that he has  been 

having extreme pain in his L knee region and rates his current pain  to be 6-

7/10. He reports that the pain medications are not taking away the  edge of his 

pain like they typically do following a procedure. He denies  any fevers. He 

reports that he has been having the chills and then the  sweats. He also reports

that sleeping has been difficult since he cannot  find a position that is 

comfortable as his R hip has been causing him more  issues since the surgical 

intervention was performed. He rates the R hip  pain to be 5-6/10. He reports 

that the CPM has not been working properly;  however, he states that the issue 

was fixed today with a new CPM. He  reports that his current setting on the CPM 

were 0-50 degrees and was  instructed to increase it by 5 degrees every other 

day. He describes the  pain to be constant burning and sharp. He reports that he

has been icing  his knee as much as possible but typically feels worse after 

doing so. He  denies any calf pain. He reports that the knee region feels tight 

like it  has significant swelling. . Pain location is L anterior-lateral regions

. Pain scale is 6 on a ten point pain scale.  



REHAB PROBLEM LIST: Increased Pain

Decreased ROM

Decreased Strength

Decreased Endurance

Decreased Balance

Decreased Function

Decreased ADL's

Decreased Mobility

Decreased Gait



PREVIOUS MEDICAL HISTORY: See EMR

OCCUPATION: Self Employed



OBJECTIVE: 



Posture: He demonstrates forward head, increased B rounded shoulders, increased 

thoracic kyphosis, and decreased lumbar lordosis. 



ROM: PROM of L Knee: extension: 8 degrees from 0. flexion: 85 degrees. 



Strength: Quad lag of 25 degrees



Palpation: TTP: anterior to lateral to posterior to medial joint lines of his L 

knee,  anterior thigh, posterior capsule, posterior thigh, medial and lateral  

thigh. 



Sensation: Will test in the future



Special Tests: Incision healing well; no signs or symptoms of infection



Mobility: Independent



Gait: With AD he demonstrated significant antalgic gait, decreased R step 

length,  increased base of support, decreased pelvic rotation, and increased  

lateral trunk movement. 



ASSESSMENT: Adán will benefit from skilled physial therapy addressing the listed

 impairments to improve function and return to prior level of function. 



Short Term Goals

2 weeks: Pt will demonstrate L knee AROM from baseline to 0-100 degrees to  

improve function and QOL. 

4 weeks: Pt will demonstrate L knee AROM from baseline to 0-130 degrees to  

improve function and QOL. 

5 weeks: Pt will transition from crutches to cane to independent and return  to 

prior level of function with his gait mechanics to improve function and  QOL. 

6 weeks: Pt will demonstrate increased B LE strength from baseline to 4+/5  or 

greater to improve function and improve QOL.



Patient's Goals

get back to work, get back to walking, and be able to get out of the house  and 

do stuff



PLAN:  Patient to be seen for Manual Therapy/STM/MET

Strengthening/condition

Ice/Heat

Range of Motion

Spinal Stabilization

Work Hardening/Cond

Stretching

Iontophoresis

Neuromuscular Re-ed

Closed Chain Program

Electrical Stim

Posture/Body mechanics

Gait Trg/Balance Trg

Home Exercise Program

Therapeutic Activities



2-3x/week for 2 Months



If you have any questions, comments, or concerns about this report or plan, 

please contact me at [phone].



Thank you, 



Ruben Vick, PT, DPT 

MTDD

## 2019-01-16 NOTE — PT PLAN OF CARE
Physician: Garrett Hinojosa MD

Patient is being seen: 2-3x/week         Therapist: Ruben Vick, PT, DPT 

Medical Diagnosis: L knee medial unicompartmental

Treatment Diagnosis: same, altered gait mechanics

Date of Onset: 12/19/18         Date of Initial Evaluation: 12/27/18

Date patient was last seen: 01/16/19

Number of treatments: 10         Number of cancellations/No shows: 0



INTERVENTIONS:

Manual Therapy/STM/MET

Strengthening/condition

Ice/Heat

Range of Motion

Spinal Stabilization

Work Hardening/Cond

Stretching

Iontophoresis

Neuromuscular Re-ed

Closed Chain Program

Electrical Stim

Posture/Body mechanics

Gait Trg/Balance Trg

Home Exercise Program

Therapeutic Activities





GOALS:

2 weeks: Pt will demonstrate L knee AROM from baseline to 0-100 degrees to  

improve function and QOL. MET 

4 weeks: Pt will demonstrate L knee AROM from baseline to 0-130 degrees to  

improve function and QOL. Not MET 

5 weeks: Pt will transition from crutches to cane to independent and return  to 

prior level of function with his gait mechanics to improve function and  QOL. 

MET

6 weeks: Pt will demonstrate increased B LE strength from baseline to 4+/5  or 

greater to improve function and improve QOL.Not MET 



PATIENT'S GOAL:

get back to work, get back to walking, and be able to get out of the house  and 

do stuff



Status of Patient's Goals: Progressing well



Patient Compliance: Good         Prognosis: Excellent



Reasons for continuing therapy: This is  a progress note for Adán Ford. He 

reports that he continues to have pain and rates it to be 3-4/10. He reports 

that the pain does not allow him to get comfortable so that he can get a good 

night rest. He reports that he feels like the motion is coming back well and 

states that he has been moving it a lot throughout the days along with ice and 

elevation. He continues to report swelling around the knee. He continues to 

demonstrate increased L knee extension and flexion, increased gait mechanics, 

but continues to have a mild antalgic gait, increased quad strength as he 

demonstrates no quad lag, increased accessory mobility of his patella, and 

increased accessory incisional mobility. We will continue to improve range of 

motion, decrease swelling, increase quad strength and then transition to 

improving and returning to prior level of function with strength, endurance, and

gait. 



Posture: He demonstrates forward head, increased B rounded shoulders, increased 

thoracic kyphosis, and decreased lumbar lordosis. 

ROM: PROM of L Knee: extension:  0 degrees. flexion: 120-125 degrees. 

Strength: Quad lag of 0 degrees

Special Tests: Incision healing well; no signs or symptoms of infection

Mobility: Independent



If you have any questions, please contact me at . 



Thank you, 



Ruben Vick, PT, DPT 

MTDD

## 2019-01-25 ENCOUNTER — HOSPITAL ENCOUNTER (OUTPATIENT)
Dept: HOSPITAL 89 - LAB | Age: 74
End: 2019-01-25
Attending: INTERNAL MEDICINE
Payer: MEDICARE

## 2019-01-25 VITALS — BODY MASS INDEX: 27.82 KG/M2

## 2019-01-25 DIAGNOSIS — I82.409: Primary | ICD-10-CM

## 2019-01-25 DIAGNOSIS — Z96.652: ICD-10-CM

## 2019-01-25 DIAGNOSIS — I10: ICD-10-CM

## 2019-01-25 DIAGNOSIS — I95.9: ICD-10-CM

## 2019-01-25 DIAGNOSIS — N28.9: ICD-10-CM

## 2019-01-25 DIAGNOSIS — E03.9: ICD-10-CM

## 2019-01-25 LAB
INR PPP: 2.97
PLATELET COUNT, AUTOMATED: 226 K/UL (ref 150–450)

## 2019-01-25 PROCEDURE — 36415 COLL VENOUS BLD VENIPUNCTURE: CPT

## 2019-01-25 PROCEDURE — 85610 PROTHROMBIN TIME: CPT

## 2019-01-25 PROCEDURE — 85025 COMPLETE CBC W/AUTO DIFF WBC: CPT

## 2019-01-25 PROCEDURE — 84132 ASSAY OF SERUM POTASSIUM: CPT

## 2019-01-25 PROCEDURE — 84520 ASSAY OF UREA NITROGEN: CPT

## 2019-01-25 PROCEDURE — 82947 ASSAY GLUCOSE BLOOD QUANT: CPT

## 2019-01-25 PROCEDURE — 82374 ASSAY BLOOD CARBON DIOXIDE: CPT

## 2019-01-25 PROCEDURE — 84295 ASSAY OF SERUM SODIUM: CPT

## 2019-01-25 PROCEDURE — 82435 ASSAY OF BLOOD CHLORIDE: CPT

## 2019-01-25 PROCEDURE — 84155 ASSAY OF PROTEIN SERUM: CPT

## 2019-01-25 PROCEDURE — 84460 ALANINE AMINO (ALT) (SGPT): CPT

## 2019-01-25 PROCEDURE — 84075 ASSAY ALKALINE PHOSPHATASE: CPT

## 2019-01-25 PROCEDURE — 82310 ASSAY OF CALCIUM: CPT

## 2019-01-25 PROCEDURE — 82040 ASSAY OF SERUM ALBUMIN: CPT

## 2019-01-25 PROCEDURE — 84450 TRANSFERASE (AST) (SGOT): CPT

## 2019-01-25 PROCEDURE — 82247 BILIRUBIN TOTAL: CPT

## 2019-01-25 PROCEDURE — 82565 ASSAY OF CREATININE: CPT

## 2019-01-25 NOTE — EKG
FACILITY: [facility] 

 

PATIENT NAME: ROSCOE HARLEY

: [id number]

MR: [id number]

V: [id number]

EXAM DATE: 

ORDERING PHYSICIAN: OMARI DREW

TECHNOLOGIST: TEDDY

 

Test Reason : SOB

Blood Pressure : ***/*** mmHG

Vent. Rate : 084 BPM     Atrial Rate : 084 BPM

   P-R Int : 200 ms          QRS Dur : 106 ms

    QT Int : 370 ms       P-R-T Axes : 065 -72 062 degrees

   QTc Int : 437 ms

 

Sinus rhythm with occasional premature ventricular complexes

Left axis deviation

Inferior-posterior infarct (cited on or before 29-AUG-2018)

Abnormal ECG

When compared with ECG of 12-DEC-2018 10:58,

premature ventricular complexes are now present

 

Referred By:             Confirmed By:

## 2019-02-12 NOTE — PT PLAN OF CARE
Physician: Garrett Hinojosa MD

Patient is being seen: 2-3x/week         Therapist: Ruben Vick, PT, DPT 

Medical Diagnosis: L knee medial unicompartmental

Treatment Diagnosis: same, altered gait mechanics

Date of Onset: 12/19/18         Date of Initial Evaluation: 12/27/18

Date patient was last seen: 02/11/19

Number of treatments: 21         Number of cancellations/No shows: 0



INTERVENTIONS:

Manual Therapy/STM/MET

Strengthening/condition

Ice/Heat

Range of Motion

Spinal Stabilization

Work Hardening/Cond

Stretching

Iontophoresis

Neuromuscular Re-ed

Closed Chain Program

Electrical Stim

Posture/Body mechanics

Gait Trg/Balance Trg

Home Exercise Program

Therapeutic Activities





GOALS:

2 weeks: Pt will demonstrate L knee AROM from baseline to 0-100 degrees to  

improve function and QOL. MET 

4 weeks: Pt will demonstrate L knee AROM from baseline to 0-130 degrees to  

improve function and QOL. MET 

5 weeks: Pt will transition from crutches to cane to independent and return  to 

prior level of function with his gait mechanics to improve function and  QOL. 

MET 

6 weeks: Pt will demonstrate increased B LE strength from baseline to 4+/5  or 

greater to improve function and improve QOL. Progressing



PATIENT'S GOAL:

get back to work, get back to walking, and be able to get out of the house  and 

do stuff



Status of Patient's Goals: 3/4 MET



Patient Compliance:  Good         Prognosis: Excellent



Reasons for continuing therapy: This is a progress note for Adán Ford. He 

reports that he is doing well with the occasional ache and rates it to be 1/10. 

He demonstrates full L knee PROM-AROM into extension and flexion, increased B LE

strength (has progressed to eccentrics as well), decreased pain, improvements 

with gait mechanics as he demonstrates equal step lengths, step clearance, and 

no antalgic gait unless he does not focus on his gait mechanics and then it 

turns into minimal antalgic gait. He is progressing well and will progress to 

home exercise program in the next 10 sessions or less.



Posture: He demonstrates forward head, increased B rounded shoulders, increased 

thoracic kyphosis, and decreased lumbar lordosis. 

ROM: PROM of L Knee: extension: -5 degrees. flexion: 130 degrees. 

Strength: Quad lag of 0 degrees: R LE: 5/5. L LE: 4+/5. 

Special Tests: Incision healing well; no signs or symptoms of infection

Mobility: Independent



If you have any questions or concerns, please contact me at . 



Thank you, 



Ruben Vick, PT, DPT 

[facility]

## 2019-03-07 ENCOUNTER — HOSPITAL ENCOUNTER (OUTPATIENT)
Dept: HOSPITAL 89 - PT | Age: 74
LOS: 20 days | End: 2019-03-27
Attending: ORTHOPAEDIC SURGERY
Payer: MEDICARE

## 2019-03-07 VITALS — BODY MASS INDEX: 27.82 KG/M2

## 2019-03-07 DIAGNOSIS — Z96.652: Primary | ICD-10-CM

## 2019-03-07 DIAGNOSIS — R26.89: ICD-10-CM

## 2019-03-07 PROCEDURE — 97110 THERAPEUTIC EXERCISES: CPT

## 2019-03-07 PROCEDURE — 97032 APPL MODALITY 1+ESTIM EA 15: CPT

## 2019-03-07 PROCEDURE — 97010 HOT OR COLD PACKS THERAPY: CPT

## 2019-03-07 PROCEDURE — 97140 MANUAL THERAPY 1/> REGIONS: CPT

## 2019-03-07 PROCEDURE — 97161 PT EVAL LOW COMPLEX 20 MIN: CPT

## 2019-03-07 NOTE — PT PLAN OF CARE
Physician: Garrett Hinojosa MD

Patient is being seen: 2x/week         Therapist: Ruben Vick, PT, DPT 

Medical Diagnosis: L knee medial unicompartmental

Treatment Diagnosis: same, altered gait mechanics

Date of Onset: 12/19/18         Date of Initial Evaluation: 12/27/18

Date patient was last seen: 03/07/19

Number of treatments: 28         Number of cancellations/No shows: 1



INTERVENTIONS:

Manual Therapy/STM/MET

Strengthening/condition

Ice/Heat

Range of Motion

Spinal Stabilization

Work Hardening/Cond

Stretching

Iontophoresis

Neuromuscular Re-ed

Closed Chain Program

Electrical Stim

Posture/Body mechanics

Gait Trg/Balance Trg

Home Exercise Program

Therapeutic Activities





GOALS:

2 weeks: Pt will demonstrate L knee AROM from baseline to 0-100 degrees to  

improve function and QOL. MET 

4 weeks: Pt will demonstrate L knee AROM from baseline to 0-130 degrees to  

improve function and QOL. MET 

5 weeks: Pt will transition from crutches to cane to independent and return  to 

prior level of function with his gait mechanics to improve function and  QOL. 

MET 

6 weeks: Pt will demonstrate increased B LE strength from baseline to 4+/5  or 

greater to improve function and improve QOL. MET



PATIENT'S GOAL:

get back to work, get back to walking, and be able to get out of the house  and 

do stuff



Status of Patient's Goals: 4/4 MET



Patient Compliance:  Good         Prognosis: Excellent



Reasons for continuing therapy: This is a discharge note for Adán Ford. He 

reports that he is doing well. He reports that he can do everything that he 

could do prior to the surgery. He reports that the only thing that continues to 

be a little difficult is going down the stairs. He feels like he is independent 

with his home exercise program and feels like he is ready for discharge. He has 

progressed well within PT and demonstrated the following improvements: full L 

knee extension and flexion with normalized end feels, normalized gait mechanics,

return to prior strength level, return to prior balance strategies level, and 

independent with his home exercise program. He has met all of his goals. As a 

result, he will be discharged from PT to [facility]. 



Posture: He demonstrates forward head, increased B rounded shoulders, increased 

thoracic kyphosis, and decreased lumbar lordosis. 

ROM: PROM of L Knee: extension: -5 degrees. flexion: 135 degrees. 

Strength: Quad lag of 0 degrees: R LE: 5/5. L LE: 5/5. 

Special Tests: Incision healing well; no signs or symptoms of infection

Mobility: Independent



If you have any questions or concerns, please contact me at . 



Thank you, 



Ruben Vick, PT, DPT 

[facility]

## 2019-03-08 ENCOUNTER — HOSPITAL ENCOUNTER (INPATIENT)
Dept: HOSPITAL 89 - ER | Age: 74
LOS: 3 days | Discharge: HOME | DRG: 390 | End: 2019-03-11
Attending: SURGERY | Admitting: SURGERY
Payer: MEDICARE

## 2019-03-08 VITALS
WEIGHT: 174 LBS | BODY MASS INDEX: 25.77 KG/M2 | BODY MASS INDEX: 25.77 KG/M2 | WEIGHT: 174 LBS | HEIGHT: 69 IN | HEIGHT: 69 IN

## 2019-03-08 VITALS — SYSTOLIC BLOOD PRESSURE: 136 MMHG | DIASTOLIC BLOOD PRESSURE: 80 MMHG

## 2019-03-08 VITALS — DIASTOLIC BLOOD PRESSURE: 89 MMHG | SYSTOLIC BLOOD PRESSURE: 151 MMHG

## 2019-03-08 VITALS — SYSTOLIC BLOOD PRESSURE: 106 MMHG | DIASTOLIC BLOOD PRESSURE: 88 MMHG

## 2019-03-08 VITALS — DIASTOLIC BLOOD PRESSURE: 83 MMHG | SYSTOLIC BLOOD PRESSURE: 117 MMHG

## 2019-03-08 VITALS — DIASTOLIC BLOOD PRESSURE: 76 MMHG | SYSTOLIC BLOOD PRESSURE: 126 MMHG

## 2019-03-08 VITALS — SYSTOLIC BLOOD PRESSURE: 132 MMHG | DIASTOLIC BLOOD PRESSURE: 84 MMHG

## 2019-03-08 DIAGNOSIS — Z79.01: ICD-10-CM

## 2019-03-08 DIAGNOSIS — K56.51: Primary | ICD-10-CM

## 2019-03-08 DIAGNOSIS — R09.02: ICD-10-CM

## 2019-03-08 DIAGNOSIS — Z88.5: ICD-10-CM

## 2019-03-08 DIAGNOSIS — Z87.828: ICD-10-CM

## 2019-03-08 LAB
INR PPP: 2.09
PLATELET COUNT, AUTOMATED: 296 K/UL (ref 150–450)

## 2019-03-08 PROCEDURE — 36415 COLL VENOUS BLD VENIPUNCTURE: CPT

## 2019-03-08 PROCEDURE — 85730 THROMBOPLASTIN TIME PARTIAL: CPT

## 2019-03-08 PROCEDURE — 84132 ASSAY OF SERUM POTASSIUM: CPT

## 2019-03-08 PROCEDURE — 84460 ALANINE AMINO (ALT) (SGPT): CPT

## 2019-03-08 PROCEDURE — 96361 HYDRATE IV INFUSION ADD-ON: CPT

## 2019-03-08 PROCEDURE — 83605 ASSAY OF LACTIC ACID: CPT

## 2019-03-08 PROCEDURE — 82247 BILIRUBIN TOTAL: CPT

## 2019-03-08 PROCEDURE — 74177 CT ABD & PELVIS W/CONTRAST: CPT

## 2019-03-08 PROCEDURE — 85027 COMPLETE CBC AUTOMATED: CPT

## 2019-03-08 PROCEDURE — 99285 EMERGENCY DEPT VISIT HI MDM: CPT

## 2019-03-08 PROCEDURE — 84155 ASSAY OF PROTEIN SERUM: CPT

## 2019-03-08 PROCEDURE — 84075 ASSAY ALKALINE PHOSPHATASE: CPT

## 2019-03-08 PROCEDURE — 82565 ASSAY OF CREATININE: CPT

## 2019-03-08 PROCEDURE — 85025 COMPLETE CBC W/AUTO DIFF WBC: CPT

## 2019-03-08 PROCEDURE — 96376 TX/PRO/DX INJ SAME DRUG ADON: CPT

## 2019-03-08 PROCEDURE — 84295 ASSAY OF SERUM SODIUM: CPT

## 2019-03-08 PROCEDURE — 82310 ASSAY OF CALCIUM: CPT

## 2019-03-08 PROCEDURE — 84520 ASSAY OF UREA NITROGEN: CPT

## 2019-03-08 PROCEDURE — 82947 ASSAY GLUCOSE BLOOD QUANT: CPT

## 2019-03-08 PROCEDURE — 86140 C-REACTIVE PROTEIN: CPT

## 2019-03-08 PROCEDURE — 85610 PROTHROMBIN TIME: CPT

## 2019-03-08 PROCEDURE — 84450 TRANSFERASE (AST) (SGOT): CPT

## 2019-03-08 PROCEDURE — 96374 THER/PROPH/DIAG INJ IV PUSH: CPT

## 2019-03-08 PROCEDURE — 96375 TX/PRO/DX INJ NEW DRUG ADDON: CPT

## 2019-03-08 PROCEDURE — 83690 ASSAY OF LIPASE: CPT

## 2019-03-08 PROCEDURE — 82435 ASSAY OF BLOOD CHLORIDE: CPT

## 2019-03-08 PROCEDURE — 71045 X-RAY EXAM CHEST 1 VIEW: CPT

## 2019-03-08 PROCEDURE — 82374 ASSAY BLOOD CARBON DIOXIDE: CPT

## 2019-03-08 PROCEDURE — 82040 ASSAY OF SERUM ALBUMIN: CPT

## 2019-03-08 RX ADMIN — HYDROMORPHONE HYDROCHLORIDE PRN MG: 2 INJECTION, SOLUTION INTRAMUSCULAR; INTRAVENOUS; SUBCUTANEOUS at 06:19

## 2019-03-08 RX ADMIN — DEXTROSE MONOHYDRATE, SODIUM CHLORIDE, AND POTASSIUM CHLORIDE PRN MLS/HR: 50; 4.5; 1.49 INJECTION, SOLUTION INTRAVENOUS at 13:03

## 2019-03-08 RX ADMIN — HYDROMORPHONE HYDROCHLORIDE PRN MG: 2 INJECTION, SOLUTION INTRAMUSCULAR; INTRAVENOUS; SUBCUTANEOUS at 13:04

## 2019-03-08 RX ADMIN — HYDROMORPHONE HYDROCHLORIDE PRN MG: 2 INJECTION, SOLUTION INTRAMUSCULAR; INTRAVENOUS; SUBCUTANEOUS at 10:16

## 2019-03-08 RX ADMIN — Medication PRN MG: at 18:41

## 2019-03-08 RX ADMIN — DEXTROSE MONOHYDRATE, SODIUM CHLORIDE, AND POTASSIUM CHLORIDE PRN MLS/HR: 50; 4.5; 1.49 INJECTION, SOLUTION INTRAVENOUS at 23:02

## 2019-03-08 RX ADMIN — HYDROMORPHONE HYDROCHLORIDE PRN MG: 2 INJECTION, SOLUTION INTRAMUSCULAR; INTRAVENOUS; SUBCUTANEOUS at 08:20

## 2019-03-08 RX ADMIN — HYDROMORPHONE HYDROCHLORIDE PRN MG: 2 INJECTION, SOLUTION INTRAMUSCULAR; INTRAVENOUS; SUBCUTANEOUS at 15:25

## 2019-03-08 RX ADMIN — HYDROMORPHONE HYDROCHLORIDE PRN MG: 2 INJECTION, SOLUTION INTRAMUSCULAR; INTRAVENOUS; SUBCUTANEOUS at 17:43
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
## 2019-03-08 NOTE — RADIOLOGY IMAGING REPORT
FACILITY: [facility] 

 

PATIENT NAME: Adán Ford

: 1945

MR: [id number]

V: 

EXAM DATE: 

ORDERING PHYSICIAN: CHRISTIANE ROMAN

TECHNOLOGIST: 

 

Location: [facility]

Patient: Adán Ford

: 1945

MRN: [id number]

Visit/Account:[id number]

Date of Sevice:  3/08/2019

 

ACCESSION #: [id number]

 

CT ABDOMEN PELVIS W/ CON

 

HISTORY: Periumbilical pain. History of small bowel obstruction.

 

COMPARISON: 11/3/2018 and studies dating to 10/1/2006.

 

TECHNIQUE: Axial images were obtained from the lung bases through the symphysis pubis with intravenou
s contrast. Sagittal and coronal reformats were performed.

 

One of the following dose optimization techniques was utilized in the performance of this exam: Autom
ated exposure control; adjustment of the mA and/or kV according to the patient's size; or use of an i
terative reconstruction technique. Specific details can be referenced in the facility's radiology CT 
exam operational policy.

 

CONTRAST: 75 mL IV Isovue-370.

 

 

FINDINGS:

 

Lower chest: Stable atelectasis or scarring at the right base and stable associated bronchiectasis. T
here is coronary artery calcification, moderate in severity.

 

Liver: Liver is diffusely decreased in attenuation, compatible with hepatic steatosis. There is a samantha
cified granuloma in the inferior right lobe. There are also 2 adjacent calcifications inferior to the
 right lobe that may be due to prior infection, unchanged. There is a too small to characterize low a
ttenuating lesion in the subcapsular right lobe of liver (coronal image 52), unchanged.

 

Gallbladder/biliary: Normal.

 

Pancreas: Normal.

 

Spleen: Normal.

 

Adrenals: Normal.

 

Kidneys/ureters/bladder: Normal.

 

GI/mesentery/peritoneal cavity: There is a small bowel anastomosis in the ileum. Anastomosis is paten
t. There are dilated fluid-filled loops of bowel in the upper to mid abdomen with the transition in t
he lower abdomen, just left of midline (axial image 92 series 2, coronal image 45, and sagittal image
 85). There is a small bowel feces sign just proximal to the transition. There is a small amount of r
eactive free fluid adjacent to the dilated small bowel loops. No free air. Distal small bowel loops a
re decompressed. The appendix is not discretely visualized. No inflammatory stranding in the right lo
wer quadrant. There is sigmoid diverticulosis without diverticulitis.

 

Vessels: There is mild to moderate atherosclerotic disease. There is an aneurysm of the left common i
liac artery measuring 2.7 cm (axial image 97), unchanged. No dissection. Left external iliac vein is 
chronically occluded.

 

Nodes: Normal.

 

Pelvis: There is coarse calcification along the left pelvic sidewall, tracking the course of the exte
rnal iliac artery, unchanged. There are pelvic phleboliths. There are coarse prostate calcifications.
 Prostate is prominent, measuring 5.5 cm.

 

Bones/vertebra/soft tissues: Small fat-containing left periumbilical hernia (image 93 series 2). Ther
e are varices in the ventral pelvic subcutaneous fat. There is mild degenerative change of the hips. 
There is rightward curvature of the lumbar spine. There is moderate to severe multilevel degenerative
 change of the spine, unchanged. There are numerous vacuum clefts. There is stable wedging of T11-L1.
 There is stable 5 mm retrolisthesis of L3 compared to L4.

 

IMPRESSION:

1. Small bowel obstruction with transition in the left lower abdomen. There is a small amount of reac
tive free fluid adjacent to dilated bowel loops.

2. Sigmoid diverticulosis.

3. Hepatic steatosis. It can progress to steatohepatitis and eventual cirrhosis.

4. Moderate coronary artery calcification.

5. 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

 

Findings a small bowel obstruction were discussed by phone with CHRISTIANE ROMAN on 3/8/2019 3:33 AM.

 

Report Dictated By: Lyssa Arredondo at 3/8/2019 3:18 AM

 

Report E-Signed By: Lyssa Arredondo  at 3/8/2019 3:37 AM

 

WSN:[id number]
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
## 2019-03-09 VITALS — DIASTOLIC BLOOD PRESSURE: 77 MMHG | SYSTOLIC BLOOD PRESSURE: 132 MMHG

## 2019-03-09 VITALS — SYSTOLIC BLOOD PRESSURE: 137 MMHG | DIASTOLIC BLOOD PRESSURE: 61 MMHG

## 2019-03-09 VITALS — DIASTOLIC BLOOD PRESSURE: 75 MMHG | SYSTOLIC BLOOD PRESSURE: 127 MMHG

## 2019-03-09 VITALS — DIASTOLIC BLOOD PRESSURE: 80 MMHG | SYSTOLIC BLOOD PRESSURE: 147 MMHG

## 2019-03-09 VITALS — DIASTOLIC BLOOD PRESSURE: 74 MMHG | SYSTOLIC BLOOD PRESSURE: 134 MMHG

## 2019-03-09 VITALS — DIASTOLIC BLOOD PRESSURE: 88 MMHG | SYSTOLIC BLOOD PRESSURE: 139 MMHG

## 2019-03-09 LAB — PLATELET COUNT, AUTOMATED: 200 K/UL (ref 150–450)

## 2019-03-09 RX ADMIN — DEXTROSE MONOHYDRATE, SODIUM CHLORIDE, AND POTASSIUM CHLORIDE PRN MLS/HR: 50; 4.5; 1.49 INJECTION, SOLUTION INTRAVENOUS at 23:53

## 2019-03-09 RX ADMIN — DEXTROSE MONOHYDRATE, SODIUM CHLORIDE, AND POTASSIUM CHLORIDE PRN MLS/HR: 50; 4.5; 1.49 INJECTION, SOLUTION INTRAVENOUS at 10:01

## 2019-03-09 RX ADMIN — PANTOPRAZOLE SODIUM SCH MG: 40 INJECTION, POWDER, FOR SOLUTION INTRAVENOUS at 09:54

## 2019-03-09 NOTE — GENERAL SURGERY PROGRESS NOTE
Subjective


Progress Notes


Subjective


less pain, no flatus, feels good; required increased Oxygen overnight though 


denies CP or SOB





Physical Exam





Vital Signs








  Date Time  Temp Pulse Resp B/P (MAP) Pulse Ox O2 Delivery O2 Flow Rate FiO2


 


3/9/19 08:03     93   


 


3/9/19 07:11 98.1 76 14 132/77 (95)  Nasal Cannula 2.0 














Intake and Output 


 


 3/9/19





 07:00


 


Intake Total 1331 ml


 


Balance 1331 ml


 


 


 


Intake IV Total 1331 ml


 


# Voids 4








General Appearance:  Alert, Awake, No Acute Distress, Afebrile


Cardiovascular:  Normal Rhythm & Peripheral Pulses


Respiratory:  No Respiratory Distress, Clear to Auscultation


GI:  Soft and Non-Tender, Other (non-distended, non-tender)


Musculoskeletal:  No Weakness/Pain


Integumentary:  Skin Intact without Lesion / Mass


Psych:  Alert & Oriented X3, Appropriate Mood & Affect


Result Diagram:  


3/9/19 0531                                                                     


          3/9/19 0531





Monitor Interpretation:  Normal Sinus Rhythm





Assessment and Plan


Problems:  


(1) Small bowel obstruction due to adhesions


Status:  Acute


Assessment & Plan:  Recurrent partial small bowel obstruction in the setting of 


multiple prior surgeries and dense adhesions per pt report.  He will be admitted


with IVF hydration, bowel rest and serial abd exams.  I stressed the importance 


of NGT decompression with the pt and family at the bedside.  He continues to 


decline at this time.  Without peritonitis on exam he does not require emergent 


operative intervention at this time.





3/9/19: stable overnight, await return of bowel function. Cont NPO.





(2) Hypoxia


Status:  Chronic


Assessment & Plan:  uses oxygen at home 2 LPM baseline.  Will check CXR this am.


Cont pulm toilet.





Time Spent:  > 30 min





Exam


Sepsis Risk:  No Definite Risk











ANDREW FRANCO MD                Mar 9, 2019 09:27

## 2019-03-09 NOTE — RADIOLOGY IMAGING REPORT
FACILITY: [facility] 

 

PATIENT NAME: Adán Ford

: 1945

MR: [id number]

V: [id number]

EXAM DATE: 

ORDERING PHYSICIAN: ANDREW FRANCO

TECHNOLOGIST: 

 

Location: [facility]

Patient: Adán Ford

: 1945

MRN: [id number]

Visit/Account:[id number]

Date of Sevice:  3/09/2019

 

ACCESSION #: [id number]

 

Technique: CHEST SINGLE AP

 

HISTORY: hypoxia

 

Comparison studies:  Chest radiograph 2018 CTA chest 2018

 

FINDINGS: There remains elevation of the right hemidiaphragm. Right midlung atelectasis and/or scarri
ng is noted. No acute airspace consolidation. The cardiac silhouette is unchanged.

 

IMPRESSION:

1.  No acute cardiopulmonary process.

2. Chronic findings as above.

 

Report Dictated By: Mason Amaral DO at 3/9/2019 12:02 PM

 

Report E-Signed By: Mason Amaral DO  at 3/9/2019 12:03 PM

 

WSN:[id number]

## 2019-03-10 VITALS — DIASTOLIC BLOOD PRESSURE: 97 MMHG | SYSTOLIC BLOOD PRESSURE: 177 MMHG

## 2019-03-10 VITALS — SYSTOLIC BLOOD PRESSURE: 162 MMHG | DIASTOLIC BLOOD PRESSURE: 84 MMHG

## 2019-03-10 VITALS — SYSTOLIC BLOOD PRESSURE: 127 MMHG | DIASTOLIC BLOOD PRESSURE: 74 MMHG

## 2019-03-10 VITALS — DIASTOLIC BLOOD PRESSURE: 88 MMHG | SYSTOLIC BLOOD PRESSURE: 139 MMHG

## 2019-03-10 VITALS — SYSTOLIC BLOOD PRESSURE: 153 MMHG | DIASTOLIC BLOOD PRESSURE: 84 MMHG

## 2019-03-10 LAB — INR PPP: 1.26

## 2019-03-10 RX ADMIN — ENOXAPARIN SODIUM SCH MG: 100 INJECTION SUBCUTANEOUS at 16:47

## 2019-03-10 RX ADMIN — BISACODYL SCH MG: 10 SUPPOSITORY RECTAL at 16:49

## 2019-03-10 RX ADMIN — DEXTROSE MONOHYDRATE, SODIUM CHLORIDE, AND POTASSIUM CHLORIDE PRN MLS/HR: 50; 4.5; 1.49 INJECTION, SOLUTION INTRAVENOUS at 12:18

## 2019-03-10 RX ADMIN — DOCUSATE SODIUM SCH MG: 50 LIQUID ORAL at 20:48

## 2019-03-10 RX ADMIN — WARFARIN SODIUM SCH MG: 5 TABLET ORAL at 16:46

## 2019-03-10 RX ADMIN — Medication PRN MG: at 03:42

## 2019-03-10 RX ADMIN — DOCUSATE SODIUM SCH MG: 50 LIQUID ORAL at 16:48

## 2019-03-10 RX ADMIN — BISACODYL SCH MG: 10 SUPPOSITORY RECTAL at 20:48

## 2019-03-10 RX ADMIN — PANTOPRAZOLE SODIUM SCH MG: 40 INJECTION, POWDER, FOR SOLUTION INTRAVENOUS at 09:36

## 2019-03-10 NOTE — GENERAL SURGERY PROGRESS NOTE
Subjective


Progress Notes


Subjective


+ flatus, no NV, no stool





Physical Exam





Vital Signs








  Date Time  Temp Pulse Resp B/P (MAP) Pulse Ox O2 Delivery O2 Flow Rate FiO2


 


3/10/19 07:01 98.3 100 18 162/84 (110) 96 Nasal Cannula 3.0 














Intake and Output 


 


 3/10/19





 07:00


 


Intake Total 1940 ml


 


Balance 1940 ml


 


 


 


Intake IV Total 1940 ml


 


# Voids 3








General Appearance:  Alert, Awake, No Acute Distress, Afebrile


Cardiovascular:  Normal Rhythm & Peripheral Pulses


Respiratory:  No Respiratory Distress, Clear to Auscultation


GI:  Soft and Non-Tender, Other (no peritoneal signs, non-distended)


Musculoskeletal:  No Weakness/Pain


Integumentary:  Skin Intact without Lesion / Mass


Psych:  Alert & Oriented X3, Appropriate Mood & Affect


Result Diagram:  


3/9/19 0531                                                                     


          3/9/19 0531





Monitor Interpretation:  Normal Sinus Rhythm





Assessment and Plan


Problems:  


(1) Small bowel obstruction due to adhesions


Status:  Acute


Assessment & Plan:  Recurrent partial small bowel obstruction in the setting of 


multiple prior surgeries and dense adhesions per pt report.  He will be admitted


with IVF hydration, bowel rest and serial abd exams.  I stressed the importance 


of NGT decompression with the pt and family at the bedside.  He continues to d


ecline at this time.  Without peritonitis on exam he does not require emergent 


operative intervention at this time.





3/9/19: stable overnight, await return of bowel function. Cont NPO.





3/10/19: SBO resolving, limited sips clear liquid.  





(2) Hypoxia


Status:  Chronic


Assessment & Plan:  uses oxygen at home 2 LPM baseline.  Will check CXR this am.


Cont pulm toilet.





3/10/19: hypoxia improved, O2 requirements back to baseline.





Time Spent:  > 30 min





Exam


Sepsis Risk:  No Definite Risk











ANDREW FRANCO MD               Mar 10, 2019 07:55

## 2019-03-11 VITALS — SYSTOLIC BLOOD PRESSURE: 146 MMHG | DIASTOLIC BLOOD PRESSURE: 87 MMHG

## 2019-03-11 VITALS — DIASTOLIC BLOOD PRESSURE: 86 MMHG | SYSTOLIC BLOOD PRESSURE: 138 MMHG

## 2019-03-11 VITALS — DIASTOLIC BLOOD PRESSURE: 84 MMHG | SYSTOLIC BLOOD PRESSURE: 141 MMHG

## 2019-03-11 LAB
INR PPP: 1.18
PLATELET COUNT, AUTOMATED: 192 K/UL (ref 150–450)

## 2019-03-11 RX ADMIN — Medication PRN MG: at 12:51

## 2019-03-11 RX ADMIN — BISACODYL SCH MG: 10 SUPPOSITORY RECTAL at 08:18

## 2019-03-11 RX ADMIN — WARFARIN SODIUM SCH MG: 5 TABLET ORAL at 12:56

## 2019-03-11 RX ADMIN — DOCUSATE SODIUM SCH MG: 50 LIQUID ORAL at 08:18

## 2019-03-11 RX ADMIN — ENOXAPARIN SODIUM SCH MG: 100 INJECTION SUBCUTANEOUS at 08:18

## 2019-03-11 RX ADMIN — PANTOPRAZOLE SODIUM SCH MG: 40 INJECTION, POWDER, FOR SOLUTION INTRAVENOUS at 08:18
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
## 2019-03-11 NOTE — SHORT(OUTPT) DISCHARGE SUMMARY
Discharge Summary


Reason for Hosp/Final Diag:  


(1) Small bowel obstruction due to adhesions


Status:  Acute


Hospital Course & Plan:  Recurrent partial small bowel obstruction in the 


setting of multiple prior surgeries and dense adhesions per pt report.  He will 


be admitted with IVF hydration, bowel rest and serial abd exams.  I stressed the


importance of NGT decompression with the pt and family at the bedside.  He 


continues to decline at this time.  Without peritonitis on exam he does not 


require emergent operative intervention at this time.





3/9/19: stable overnight, await return of bowel function. Cont NPO.





3/10/19: SBO resolving, limited sips clear liquid.  





3/11/19: continues to improve. ADAT. Probable DC later today.  Will need chronic


PO cathartics.  DC teaching done.





3/11/19:  Doing well.  Tolerating regular diet.  He's had several BMs today.  


Wishes to go home.  Will d/c to home today.





(2) Hypoxia


Status:  Chronic


Hospital Course & Plan:  uses oxygen at home 2 LPM baseline.  Will check CXR 


this am. Cont pulm toilet.





3/10/19: hypoxia improved, O2 requirements back to baseline.





3/11/19: no further resp difficulties.





Departure


Discharge to:  Home, Self Care





Discharge Instructions


Home Meds


Active Scripts


Levothyroxine Sodium (LEVOTHYROXINE SODIUM) 75 Mcg Tablet, 75 MCG PO QDAY, #90 


TAB 3 Refills


   Prov:OMARI DREW MD         2/11/19


Tramadol Hcl (TRAMADOL HCL) 50 Mg Tablet, 50 MG PO QID PRN for PAIN, #120 TAB 5 


Refills


   Prov:OMARI DREW MD         1/25/19


Hydroxyzine Hcl (HYDROXYZINE HCL) 25 Mg Tablet, 25 MG PO QHS PRN for sleep, 


itching, #30 TAB 3 Refills


   Prov:OMARI DREW MD         1/25/19


Ondansetron (ZOFRAN ODT) 4 Mg Tab.rapdis, 4 MG PO every 6 hours PRN for 


NAUSEA/VOMITING, #10 TAB


   TAKE 1 TABLET BY MOUTH EVERY 12 HOURS


   Prov:SORAIDA MEDEIROS DO         10/10/18


Febuxostat (ULORIC) 40 Mg Tablet, 40 MG PO QDAY, #30 TAB 11 Refills


   Prov:OMARI DREW MD         10/4/18


Pantoprazole Sodium (PANTOPRAZOLE SODIUM) 40 Mg Tablet.dr, 40 MG PO QDAY, #90 


TAB.SR 3 Refills


   Prov:OMARI DREW MD         3/21/18


Reported Medications


Warfarin Sodium (WARFARIN SODIUM) 5 Mg Tablet, 5 MG PO QDAY, TAB


   12/12/18


Folic Acid (FOLIC ACID) 1 Mg Tablet, 1 MG PO QDAY, TAB


   12/6/18


Docusate Sodium (COLACE) 100 Mg Capsule, 100 MG PO PRN, CAPSULE


   11/4/18


Discontinued Reported Medications


Lisinopril (LISINOPRIL) 5 Mg Tablet, 5 MG PO QDAY, TAB


   3/8/19


Vitamin B Complex (VITAMIN B COMPLEX) 1 Each Capsule, 1 EACH PO DAILY, CAPSULE


   12/6/18


Colchicine (COLCRYS) 0.6 Mg Tablet, 0.6 MG PO BID PRN for FOR GOUT ATTACK


   10/4/18


Diet:  Regular


Activity:  As Tolerated


Special Instructions:  


You may give yourself a dulcolax suppository as needed for constipation.


Also, starting tomorrow, take 17 grams of Miralax in a tall glass of water


every morning.  You can resume all of the rest of your medications,


including coumadin (warfarin) as they were prescribed.  Please call my


office at [phone] if you are having problems after discharge.











ULLRICH,JOHN A MD              Mar 11, 2019 15:33

## 2019-03-11 NOTE — GENERAL SURGERY PROGRESS NOTE
Subjective


Progress Notes


Subjective


+BM





Physical Exam





Vital Signs








  Date Time  Temp Pulse Resp B/P (MAP) Pulse Ox O2 Delivery O2 Flow Rate FiO2


 


3/11/19 07:54 98.2 76 16 138/86 (103) 96 Nasal Cannula 2.5 














Intake and Output 


 


 3/11/19





 07:00


 


Intake Total 2110 ml


 


Balance 2110 ml


 


 


 


Intake Oral 340 ml


 


IV Total 1770 ml


 


# Voids 5


 


# Bowel Movements 2








General Appearance:  Alert, Awake, No Acute Distress, Afebrile


Neuro:  No Gross deficits


Cardiovascular:  Normal Rhythm & Peripheral Pulses


Respiratory:  No Respiratory Distress, Clear to Auscultation


GI:  Soft and Non-Tender, Other (non-distended)


Musculoskeletal:  No Weakness/Pain


Integumentary:  Skin Intact without Lesion / Mass


Psych:  Alert & Oriented X3, Appropriate Mood & Affect


Result Diagram:  


3/11/19 0531                                                                    


           3/11/19 0531





Monitor Interpretation:  Normal Sinus Rhythm





Assessment and Plan


Problems:  


(1) Small bowel obstruction due to adhesions


Status:  Acute


Assessment & Plan:  Recurrent partial small bowel obstruction in the setting of 


multiple prior surgeries and dense adhesions per pt report.  He will be admitted


with IVF hydration, bowel rest and serial abd exams.  I stressed the importance 


of NGT decompression with the pt and family at the bedside.  He continues to 


decline at this time.  Without peritonitis on exam he does not require emergent 


operative intervention at this time.





3/9/19: stable overnight, await return of bowel function. Cont NPO.





3/10/19: SBO resolving, limited sips clear liquid.  





3/11/19: continues to improve. ADAT. Probable DC later today.  Will need chronic


PO cathartics.  DC teaching done.





(2) Hypoxia


Status:  Chronic


Assessment & Plan:  uses oxygen at home 2 LPM baseline.  Will check CXR this am.


Cont pulm toilet.





3/10/19: hypoxia improved, O2 requirements back to baseline.





3/11/19: no further resp difficulties.





Time Spent:  > 30 min





Exam


Sepsis Risk:  No Definite Risk











ANDREW FRANCO MD               Mar 11, 2019 08:08

## 2019-04-15 ENCOUNTER — HOSPITAL ENCOUNTER (OUTPATIENT)
Dept: HOSPITAL 89 - RAD | Age: 74
End: 2019-04-15
Attending: NURSE PRACTITIONER
Payer: MEDICARE

## 2019-04-15 VITALS — BODY MASS INDEX: 13.27 KG/M2

## 2019-04-15 DIAGNOSIS — R05: Primary | ICD-10-CM

## 2019-04-15 PROCEDURE — 71046 X-RAY EXAM CHEST 2 VIEWS: CPT

## 2019-04-15 NOTE — RADIOLOGY IMAGING REPORT
FACILITY: [facility] 

 

PATIENT NAME: Adán Ford

: 1945

MR: [id number]

V: [id number]

EXAM DATE: 

ORDERING PHYSICIAN: LAMINE HOLGUIN

TECHNOLOGIST: 

 

Location: [facility] [place]

Patient: Adán Ford

: 1945

MRN: [id number]

Visit/Account:[id number]

Date of Sevice:  4/15/2019

 

ACCESSION #: [id number]

 

EXAMINATION:

Chest radiographs 2 views

 

HISTORY:  Cough.

 

COMPARISON: 3/9/2019.

 

FINDINGS: PA and lateral views of the chest are submitted.

 

Lines/tubes:  None.

Lungs/pleura:  Chronic elevation of the right hemidiaphragm.  No focal consolidation or pleural effus
ion.

Heart:  Negative.

Mediastinum:  Aorta is mildly tortuous and calcified.

Bony structures/body wall:  Left shoulder arthroplasty partly visualized.

 

IMPRESSION:

 

1.  No radiographic evidence of acute cardiopulmonary disease.

 

2.  Chronic elevation of the right hemidiaphragm, unchanged.

 

Report Dictated By: Portia Savage MD at 4/15/2019 10:45 AM

 

Report E-Signed By: Portia Savage MD  at 4/15/2019 10:48 AM

 

WSN:[id number]

## 2019-04-29 ENCOUNTER — HOSPITAL ENCOUNTER (OUTPATIENT)
Dept: HOSPITAL 89 - LAB | Age: 74
End: 2019-04-29
Attending: INTERNAL MEDICINE
Payer: MEDICARE

## 2019-04-29 VITALS — BODY MASS INDEX: 13.27 KG/M2

## 2019-04-29 DIAGNOSIS — I26.99: ICD-10-CM

## 2019-04-29 DIAGNOSIS — I49.3: ICD-10-CM

## 2019-04-29 DIAGNOSIS — I82.409: Primary | ICD-10-CM

## 2019-04-29 LAB — INR PPP: 1.7

## 2019-04-29 PROCEDURE — 36415 COLL VENOUS BLD VENIPUNCTURE: CPT

## 2019-04-29 PROCEDURE — 85610 PROTHROMBIN TIME: CPT

## 2019-04-29 PROCEDURE — 93005 ELECTROCARDIOGRAM TRACING: CPT

## 2019-04-29 NOTE — EKG
FACILITY: [facility] 

 

PATIENT NAME: ROSCOE HARLEY

: [id number]

MR: [id number]

V: [id number]

EXAM DATE: 

ORDERING PHYSICIAN: OMARI DREW

TECHNOLOGIST: JASMINA

 

Test Reason : 

Blood Pressure : ***/*** mmHG

Vent. Rate : 084 BPM     Atrial Rate : 084 BPM

   P-R Int : 208 ms          QRS Dur : 106 ms

    QT Int : 394 ms       P-R-T Axes : 066 -60 060 degrees

   QTc Int : 465 ms

 

Sinus rhythm with occasional premature ventricular complexes

Left axis deviation

No ST-T abnormalities

When compared with ECG of 2019 11:47,

Relatively unchanged

Confirmed by KANDACE WAY ([id number]) on 2019 11:10:41 AM

 

Referred By:  [email or web address]           Confirmed By:KANDACE WAY

## 2019-04-30 ENCOUNTER — HOSPITAL ENCOUNTER (OUTPATIENT)
Dept: HOSPITAL 89 - US | Age: 74
End: 2019-04-30
Attending: INTERNAL MEDICINE
Payer: MEDICARE

## 2019-04-30 VITALS — BODY MASS INDEX: 13.27 KG/M2

## 2019-04-30 DIAGNOSIS — I07.1: ICD-10-CM

## 2019-04-30 DIAGNOSIS — I35.0: Primary | ICD-10-CM

## 2019-04-30 DIAGNOSIS — I34.1: ICD-10-CM

## 2019-04-30 PROCEDURE — 93306 TTE W/DOPPLER COMPLETE: CPT

## 2019-06-24 ENCOUNTER — HOSPITAL ENCOUNTER (OUTPATIENT)
Dept: HOSPITAL 89 - LAB | Age: 74
End: 2019-06-24
Attending: INTERNAL MEDICINE
Payer: MEDICARE

## 2019-06-24 VITALS — BODY MASS INDEX: 13.27 KG/M2

## 2019-06-24 DIAGNOSIS — I10: ICD-10-CM

## 2019-06-24 DIAGNOSIS — I35.0: Primary | ICD-10-CM

## 2019-06-24 DIAGNOSIS — I27.20: ICD-10-CM

## 2019-06-24 LAB
INR PPP: 2.3
LDLC SERPL-MCNC: 132 MG/DL
PLATELET COUNT, AUTOMATED: 219 K/UL (ref 150–450)

## 2019-06-24 PROCEDURE — 84295 ASSAY OF SERUM SODIUM: CPT

## 2019-06-24 PROCEDURE — 84075 ASSAY ALKALINE PHOSPHATASE: CPT

## 2019-06-24 PROCEDURE — 82247 BILIRUBIN TOTAL: CPT

## 2019-06-24 PROCEDURE — 82465 ASSAY BLD/SERUM CHOLESTEROL: CPT

## 2019-06-24 PROCEDURE — 83718 ASSAY OF LIPOPROTEIN: CPT

## 2019-06-24 PROCEDURE — 82947 ASSAY GLUCOSE BLOOD QUANT: CPT

## 2019-06-24 PROCEDURE — 84520 ASSAY OF UREA NITROGEN: CPT

## 2019-06-24 PROCEDURE — 83036 HEMOGLOBIN GLYCOSYLATED A1C: CPT

## 2019-06-24 PROCEDURE — 85610 PROTHROMBIN TIME: CPT

## 2019-06-24 PROCEDURE — 84155 ASSAY OF PROTEIN SERUM: CPT

## 2019-06-24 PROCEDURE — 84439 ASSAY OF FREE THYROXINE: CPT

## 2019-06-24 PROCEDURE — 82310 ASSAY OF CALCIUM: CPT

## 2019-06-24 PROCEDURE — 82565 ASSAY OF CREATININE: CPT

## 2019-06-24 PROCEDURE — 84478 ASSAY OF TRIGLYCERIDES: CPT

## 2019-06-24 PROCEDURE — 36415 COLL VENOUS BLD VENIPUNCTURE: CPT

## 2019-06-24 PROCEDURE — 84132 ASSAY OF SERUM POTASSIUM: CPT

## 2019-06-24 PROCEDURE — 84460 ALANINE AMINO (ALT) (SGPT): CPT

## 2019-06-24 PROCEDURE — 82435 ASSAY OF BLOOD CHLORIDE: CPT

## 2019-06-24 PROCEDURE — 84443 ASSAY THYROID STIM HORMONE: CPT

## 2019-06-24 PROCEDURE — 82040 ASSAY OF SERUM ALBUMIN: CPT

## 2019-06-24 PROCEDURE — 82374 ASSAY BLOOD CARBON DIOXIDE: CPT

## 2019-06-24 PROCEDURE — 85025 COMPLETE CBC W/AUTO DIFF WBC: CPT

## 2019-06-24 PROCEDURE — 84153 ASSAY OF PSA TOTAL: CPT

## 2019-06-24 PROCEDURE — 84450 TRANSFERASE (AST) (SGOT): CPT
